# Patient Record
Sex: MALE | Race: WHITE | Employment: FULL TIME | URBAN - METROPOLITAN AREA
[De-identification: names, ages, dates, MRNs, and addresses within clinical notes are randomized per-mention and may not be internally consistent; named-entity substitution may affect disease eponyms.]

---

## 2019-05-19 ENCOUNTER — HOSPITAL ENCOUNTER (EMERGENCY)
Age: 34
Discharge: HOME | End: 2019-05-19
Payer: COMMERCIAL

## 2019-05-19 ENCOUNTER — APPOINTMENT (OUTPATIENT)
Dept: GENERAL RADIOLOGY | Age: 34
End: 2019-05-19
Payer: COMMERCIAL

## 2019-05-19 VITALS — BODY MASS INDEX: 23.73 KG/M2 | HEIGHT: 64 IN | WEIGHT: 139 LBS

## 2019-05-19 DIAGNOSIS — M54.2 CERVICALGIA: Primary | ICD-10-CM

## 2019-05-19 DIAGNOSIS — M43.06 SPONDYLOLYSIS OF LUMBAR REGION: ICD-10-CM

## 2019-05-19 PROCEDURE — 72040 X-RAY EXAM NECK SPINE 2-3 VW: CPT

## 2019-05-19 PROCEDURE — 6370000000 HC RX 637 (ALT 250 FOR IP): Performed by: PHYSICIAN ASSISTANT

## 2019-05-19 PROCEDURE — 99283 EMERGENCY DEPT VISIT LOW MDM: CPT

## 2019-05-19 PROCEDURE — 72100 X-RAY EXAM L-S SPINE 2/3 VWS: CPT

## 2019-05-19 RX ORDER — LIDOCAINE 4 G/G
1 PATCH TOPICAL DAILY
Status: DISCONTINUED | OUTPATIENT
Start: 2019-05-19 | End: 2019-05-19 | Stop reason: HOSPADM

## 2019-05-19 RX ORDER — METHOCARBAMOL 750 MG/1
750 TABLET, FILM COATED ORAL EVERY 8 HOURS PRN
Qty: 30 TABLET | Refills: 0 | Status: SHIPPED | OUTPATIENT
Start: 2019-05-19 | End: 2019-05-29

## 2019-05-19 RX ORDER — METHOCARBAMOL 500 MG/1
750 TABLET, FILM COATED ORAL ONCE
Status: COMPLETED | OUTPATIENT
Start: 2019-05-19 | End: 2019-05-19

## 2019-05-19 RX ORDER — NAPROXEN 250 MG/1
500 TABLET ORAL ONCE
Status: COMPLETED | OUTPATIENT
Start: 2019-05-19 | End: 2019-05-19

## 2019-05-19 RX ORDER — IBUPROFEN 800 MG/1
800 TABLET ORAL EVERY 6 HOURS PRN
COMMUNITY
End: 2019-05-19 | Stop reason: ALTCHOICE

## 2019-05-19 RX ORDER — NAPROXEN 500 MG/1
500 TABLET ORAL 2 TIMES DAILY PRN
Qty: 20 TABLET | Refills: 0 | Status: SHIPPED | OUTPATIENT
Start: 2019-05-19 | End: 2019-06-26 | Stop reason: SDUPTHER

## 2019-05-19 RX ADMIN — NAPROXEN 500 MG: 250 TABLET ORAL at 11:50

## 2019-05-19 RX ADMIN — METHOCARBAMOL TABLETS 750 MG: 500 TABLET, COATED ORAL at 11:50

## 2019-05-19 SDOH — HEALTH STABILITY: MENTAL HEALTH: HOW OFTEN DO YOU HAVE A DRINK CONTAINING ALCOHOL?: NEVER

## 2019-05-19 ASSESSMENT — ENCOUNTER SYMPTOMS
DIARRHEA: 0
COLOR CHANGE: 0
CHEST TIGHTNESS: 0
VOMITING: 0
ABDOMINAL PAIN: 0
BACK PAIN: 1
SHORTNESS OF BREATH: 0
SORE THROAT: 0
NAUSEA: 0

## 2019-05-19 ASSESSMENT — PAIN DESCRIPTION - ORIENTATION: ORIENTATION: LEFT

## 2019-05-19 ASSESSMENT — PAIN SCALES - GENERAL
PAINLEVEL_OUTOF10: 8
PAINLEVEL_OUTOF10: 9

## 2019-05-19 ASSESSMENT — PAIN DESCRIPTION - PAIN TYPE: TYPE: ACUTE PAIN

## 2019-05-19 ASSESSMENT — PAIN DESCRIPTION - LOCATION: LOCATION: NECK

## 2019-05-19 NOTE — ED PROVIDER NOTES
**EVALUATED BY ADVANCED PRACTICE PROVIDER**        [address] ENCOUNTER      Pt Name: Jose Key  EYFLOR:[id number]  [place] 1985  Date of evaluation: 5/19/2019  Provider: Vladimir Perera PA-C      Chief Complaint:    Chief Complaint   Patient presents with    Neck Pain     left sided neck pain x 1 week, and right sided/middle lower back pain x 1 month.  patient's neck getting worse x last 3 days, patient unable to sleep due to pain. Nursing Notes, Past Medical Hx, Past Surgical Hx, Social Hx, Allergies, and Family Hx were all reviewed and agreed with or any disagreements were addressed in the HPI.    HPI:  (Location, Duration, Timing, Severity,Quality, Assoc Sx, Context, Modifying factors)  This is a  37 y.o. male resents to the emergency department with reports of musculoskeletal discomfort. Patient states he has had on going difficulties but twisted his neck as well as his back that it then intermittently present for a number of years. Patient denies that he had any particular injury to bring on the symptoms. He states occasionally he will get pain and discomfort in the midportion of his low back that seems to be worse somewhat on the musculature on the right-hand side but states bending twisting and tension in the area causes increasing symptoms. He states physical activity increase of this as well. He states he often times will take ibuprofen 600 mg for this with only marginal levels of relief. Patient states he is able to put up with that but he has had increasing symptoms with associated cervicalgia. He initially states that he thought that he slept wrong on his neck. He had pain and discomfort primarily left-sided but did seem to radiate into the upper portion of his left arm.   He states he does have a sensation of feeling hot mis-over the forearm down into the thumb but denies these having associated symptoms of pain and denies any he's having true radicular symptoms. He denies bowel or bladder dysfunction. He denies saddle anesthesia. He reports he's had a difficult time getting a restful night's sleep because of the above-mentioned. He states last time he had any medications or done anything for this was yesterday. He states his current level of pain and discomfort rates to be 9 out of 10. He's found nothing to make the symptoms better and are worse and with this he presents for evaluation and treatment. Upon further questioning the patient has no red flags for his spine pain. He denies he is experiencing any additional complaints at present. PastMedical/Surgical History:  History reviewed. No pertinent past medical history. History reviewed. No pertinent surgical history. Medications:  Previous Medications    No medications on file       Review of Systems:  Review of Systems   Constitutional: Negative for activity change, chills, fatigue and fever. HENT: Negative for ear pain and sore throat. Respiratory: Negative for chest tightness and shortness of breath. Cardiovascular: Negative for chest pain. Gastrointestinal: Negative for abdominal pain, diarrhea, nausea and vomiting. Genitourinary: Negative for dysuria and flank pain. Musculoskeletal: Positive for back pain, neck pain and neck stiffness. Skin: Negative for color change, rash and wound. Neurological: Negative for dizziness, tremors, seizures, syncope, facial asymmetry, speech difficulty, weakness, light-headedness, numbness and headaches. Positives and Pertinent negatives as per HPI. Except as noted above in the ROS, problem specific ROS was completed and is negative. Physical Exam:  Physical Exam   Constitutional: He is oriented to person, place, and time. He appears well-developed and well-nourished. No distress. HENT:   Head: Normocephalic and atraumatic.    Right Ear: External ear normal.   Left Ear: External ear normal.   Eyes: Conjunctivae are normal. Right eye exhibits no discharge. Left eye exhibits no discharge. No scleral icterus. Neck: Normal range of motion. No JVD present. Cardiovascular: Normal rate and regular rhythm. Exam reveals no gallop and no friction rub. No murmur heard. Pulmonary/Chest: Effort normal and breath sounds normal. No accessory muscle usage. No respiratory distress. He has no wheezes. He has no rhonchi. He has no rales. Abdominal: Soft. Normal appearance. He exhibits no distension and no mass. There is no tenderness. There is no rigidity, no rebound, no guarding and no CVA tenderness. Musculoskeletal:        Cervical back: He exhibits decreased range of motion, tenderness, bony tenderness and pain. He exhibits no swelling, no edema, no deformity, no laceration, no spasm and normal pulse. Thoracic back: Normal.        Lumbar back: He exhibits decreased range of motion, tenderness, bony tenderness and pain. He exhibits no swelling, no edema, no deformity, no laceration, no spasm and normal pulse. Primary is a tenderness over the left lateral paracervical musculature down into the trapezial musculature in the right lateral lumbar paraspinous musculature. He is a negative Spurling's examination with good range of motion through his neck. He demonstrates 5 out of 5 strength to shoulder abduction, elbow flexion extension, wrist flexion extension,  strength. Neurovascular integrity of his bilateral upper extremities or is intact. He has a normal gait noted as he ambulated to the examination room. Patient has a negative straight leg raise. The patient demonstrates 5/5 muscle strength to hip flexion, knee flexion/extension, plantar/dorsiflexion of the ankle and extensor hallicus longus testing. Neurological: He is alert and oriented to person, place, and time. He has normal strength. No cranial nerve deficit or sensory deficit. Coordination normal. GCS eye subscore is 4.  GCS verbal subscore is 5. GCS motor subscore is 6. Skin: Skin is warm and dry. He is not diaphoretic. Psychiatric: He has a normal mood and affect. His behavior is normal.   Nursing note and vitals reviewed. MEDICAL DECISION MAKING    Vitals:    Vitals:    05/19/19 1103   Weight: 139 lb (63 kg)   Height: 5' 4\" (1.626 m)       LABS:Labs Reviewed - No data to display     Remainder of labs reviewed and werenegative at this time or not returned at the time of this note. RADIOLOGY:   Non-plain film images such as CT, Ultrasound and MRI are read by the radiologist. Randal Fishman PA-C have directly visualized the radiologic plain film image(s) with the below findings:        Interpretation per the Radiologist below, if available at the time of thisnote:    XR CERVICAL SPINE (2-3 VIEWS)   Final Result   1. No acute abnormality. XR LUMBAR SPINE (2-3 VIEWS)   Final Result   1. Minimal multilevel spondylosis. MEDICAL DECISION MAKING / ED COURSE:      PROCEDURES:   Procedures  None    Patient was given:  Medications   lidocaine 4 % external patch 1 patch (1 patch Transdermal Patch Applied 5/19/19 1150)   naproxen (NAPROSYN) tablet 500 mg (500 mg Oral Given 5/19/19 1150)   methocarbamol (ROBAXIN) tablet 750 mg (750 mg Oral Given 5/19/19 1150)       The patient's detailed history of present illness is documented as above. Upon arrival to the emergency department the patient's vital signs are as documented. The patient is noted to be hemodynamically stable and afebrile. Physical examination findings are as above. Patient was medicated with the above-mentioned medications for pain relief in the emergency department setting. Radiographs of the patient's cervical spine and lumbar spine are completed as above. There is no evidence of acute fracture or dislocation. Cervical spine demonstrates loss of normal cervical lordosis bothers multilevel minimal spondylolysis noted in the lumbar spine.   This was

## 2019-06-10 ENCOUNTER — HOSPITAL ENCOUNTER (OUTPATIENT)
Dept: PHYSICAL THERAPY | Age: 34
Setting detail: THERAPIES SERIES
Discharge: HOME | End: 2019-06-10
Payer: COMMERCIAL

## 2019-06-10 PROCEDURE — 97161 PT EVAL LOW COMPLEX 20 MIN: CPT

## 2019-06-10 PROCEDURE — 97140 MANUAL THERAPY 1/> REGIONS: CPT

## 2019-06-10 PROCEDURE — 97110 THERAPEUTIC EXERCISES: CPT

## 2019-06-10 ASSESSMENT — PAIN DESCRIPTION - PROGRESSION: CLINICAL_PROGRESSION: GRADUALLY IMPROVING

## 2019-06-10 ASSESSMENT — PAIN - FUNCTIONAL ASSESSMENT: PAIN_FUNCTIONAL_ASSESSMENT: PREVENTS OR INTERFERES SOME ACTIVE ACTIVITIES AND ADLS

## 2019-06-10 ASSESSMENT — PAIN DESCRIPTION - FREQUENCY: FREQUENCY: CONTINUOUS

## 2019-06-10 ASSESSMENT — PAIN SCALES - GENERAL: PAINLEVEL_OUTOF10: 5

## 2019-06-10 ASSESSMENT — PAIN DESCRIPTION - DESCRIPTORS: DESCRIPTORS: CRAMPING;TIGHTNESS

## 2019-06-10 ASSESSMENT — PAIN DESCRIPTION - LOCATION: LOCATION: NECK;SHOULDER

## 2019-06-10 ASSESSMENT — PAIN DESCRIPTION - ORIENTATION: ORIENTATION: LEFT

## 2019-06-10 ASSESSMENT — PAIN DESCRIPTION - PAIN TYPE: TYPE: ACUTE PAIN

## 2019-06-10 ASSESSMENT — PAIN DESCRIPTION - ONSET: ONSET: AWAKENED FROM SLEEP

## 2019-06-10 NOTE — PROGRESS NOTES
some active activities and ADLs  Vital Signs  Patient Currently in Pain: Yes    Vision/Hearing  Vision  Vision: Within Functional Limits  Hearing  Hearing: Within functional limits    Orientation  Orientation  Overall Orientation Status: Within Normal Limits    Social/Functional History  Social/Functional History  Lives With: Spouse  Type of Home: House  ADL Assistance: Independent  Homemaking Assistance: Independent  Homemaking Responsibilities: Yes  Ambulation Assistance: Independent  Transfer Assistance: Independent  Active : Yes  Mode of Transportation: Car  Occupation: Full time employment  Type of occupation:  - walking, lifting periodically less than 50#     Objective          Spine  Cervical: sitting: flexion 55 deg, ext 55 deg, SBing L 45 deg, SBing R 55 deg, rot R 35 deg, rot L 45 deg    Strength RUE  R Shoulder Flexion: 5/5  R Shoulder ABduction: 5/5  R Shoulder Internal Rotation: 5/5  R Shoulder External Rotation: 5/5  R Elbow Flexion: 5/5  R Elbow Extension: 5/5  Strength LUE  L Shoulder Flexion: 5/5  L Shoulder ABduction: 4+/5  L Shoulder Internal Rotation: 5/5  L Shoulder External Rotation: 4+/5  L Elbow Flexion: 5/5  L Elbow Extension: 5/5  Strength Other  Other: R  strength 65#, 60#, 50#  L  strength: 30#, 30#, 30#       Outpatient fall risk assessment completed asking screening question if patient has fallen in the past 30 days:  [x] Yes  [] No    Based on screen for falls, patient demonstrates fall risk:  [] Yes  [x] No    Interventions based on fall risk status:  Updated Problem List within Medical History  [] Yes   [x] N/A    Asked family to assist with increased observation of the patient  [] Yes   [x] N/A    Patient kept in visible area when not closely supervised by therapist  [] Yes   [x] N/A    Repeatedly reinforce activity limits and safety needs with patient/family  [] Yes   [x] N/A    Increase frequency of rounding/monitoring patient  [] Yes   [x] N/A Assessment   Conditions Requiring Skilled Therapeutic Intervention  Body structures, Functions, Activity limitations: Decreased functional mobility ; Decreased ADL status; Decreased ROM; Decreased strength;Decreased high-level IADLs; Increased Pain  Assessment: Pt is a 36 y/o male who complains of L neck and arm pain. He does report radicular symtpoms into his L shoulder and biceps. Pt currently demos decreased cerv AROM, increased tension in cervicothoracic musculature, malalginment of cerv vertebrae, and increased pain with head movement. Pt would benefit from skilled therapy to address deficits and return to home and work activities without limitations or pain. Treatment Diagnosis: decreased cerv AROM, increased tension in cervicothoracic musculature, malalginment of cerv vertebrae, and increased pain  Prognosis: Good  Decision Making: Low Complexity  History: see above  Exam: see above  Clinical Presentation: pain reducing   Patient Education: PT role and plan  Barriers to Learning: none  REQUIRES PT FOLLOW UP: Yes         Plan   Plan  Times per week: 2x/week for 5 weeks  Current Treatment Recommendations: Strengthening, ROM, Functional Mobility Training, IADL Training, Neuromuscular Re-education, Manual Therapy - Soft Tissue Mobilization, Manual Therapy - Joint Manipulation, Modalities, Home Exercise Program, Positioning      OutComes Score              QuickDASH Total Score: 31 (06/10/19 1653)       QuickDASH Disability/Symptom Score : 45.45 % (06/10/19 1653)      Goals  Long term goals  Time Frame for Long term goals : 5 weeks:  Long term goal 1: Pt will increase L  strength by at least 20# to progress towards return to PLOF. Long term goal 2: Pt will present to one or more visits without cervical vertebral malalignment to ensure proper positioning at home. Long term goal 3: Pt will report pain at 1/10 in neck and shoulder to progress towards to no pain with daily activities or driving.    Long term goal 4: Pt will be I with HEP to avoid future injury and maintain correct alignment.           Alden Gomez, [address] [facility], DPT [id number]

## 2019-06-10 NOTE — FLOWSHEET NOTE
Physical Therapy Daily Treatment Note  Date:  6/10/2019    Patient Name:  Agus Kelsey    :  1985  MRN: [id number]  Restrictions/Precautions:    Medical/Treatment Diagnosis Information:   Diagnosis: cervicalgia  Treatment Diagnosis: decreased cerv AROM, increased tension in cervicothoracic musculature, malalginment of cerv vertebrae, and increased pain    Tracking Information:  Physician Information Referring Practitioner: CARLENE Kelly     Plan of Care Sent Date: 6/10/19 Signed Received:    Visit Count / Total Visits  1/10    Insurance Approved Visits  30 Approved Dates:     Insurance Information PT Insurance Information: [facility] (30 visit limit)     Progress Note/G-codes   [x]  Yes  []  No Next Due: #10     Pain level: 5/10     Subjective:  SEE EVAL    Objective:    SEE EVAL  Observation:   Test measurements:      Exercises:  Exercise/Equipment Resistance/Repetitions Other comments   mat PROM cerv spine x 10 all directions  UT stretch 20 sec x 2 B   levator stretch 20 sec x 2 B                                                                        Other Therapeutic Activities: 6/10/19 Patient was evaluated this date. Patient educated on diagnosis, prognosis, plan of care, benefits, and goals of physical therapy. Also discussed  frequency and duration of scheduling future physical therapy appointments as well answered all patient questions. Educated pt on cervicalgia and nerve involvement.        Home Exercise Program: 6/10/19 The following exercises were performed and added to the pt's home program (educated on appropriate frequency, intensity and duration etc.): PROM cerv spine all directions in pain free range, UT and LS stretches     Manual Treatments:  6/10/19: PROM c spine, side glides grade 2, PA mobs grade 3, rot mobs to correct upper R rotated cerv vert, SOR, manual cerv traction x 10 minutes     Modalities:  6/10/19: none, consider mech cerv traction    Timed Code Treatment Minutes: 33    Total Treatment Minutes:  48    Treatment/Activity Tolerance:  [x] Patient tolerated treatment well [] Patient limited by fatigue  [] Patient limited by pain  [] Patient limited by other medical complications  [] Other:     Prognosis: [x] Good [] Fair  [] Poor    Patient Requires Follow-up: [x] Yes  [] No    Plan:   [] Continue per plan of care [] Alter current plan (see comments)  [x] Plan of care initiated [] Hold pending MD visit [] Discharge    Plan for Next Session:  Mobility of cerv spine, flexibility of B UEs and cervicothoracic musculature, manual PRN, MOC, UE strength and  strength     Electronically signed by:  Louise Dobson, PT, DPT

## 2019-06-18 ENCOUNTER — HOSPITAL ENCOUNTER (OUTPATIENT)
Dept: PHYSICAL THERAPY | Age: 34
Setting detail: THERAPIES SERIES
Discharge: HOME | End: 2019-06-18
Payer: COMMERCIAL

## 2019-06-18 PROCEDURE — 97110 THERAPEUTIC EXERCISES: CPT

## 2019-06-18 PROCEDURE — 97140 MANUAL THERAPY 1/> REGIONS: CPT

## 2019-06-18 NOTE — FLOWSHEET NOTE
Physical Therapy Daily Treatment Note  Date:  2019    Patient Name:  Latoya Gee    :  1985  MRN: [id number]  Restrictions/Precautions:    Medical/Treatment Diagnosis Information:   Diagnosis: cervicalgia  Treatment Diagnosis: decreased cerv AROM, increased tension in cervicothoracic musculature, malalginment of cerv vertebrae, and increased pain    Tracking Information:  Physician Information Referring Practitioner: CARLENE Pickard     Plan of Care Sent Date: 6/10/19 Signed Received:    Visit Count / Total Visits  2/10    Insurance Approved Visits  30 Approved Dates:     Insurance Information PT Insurance Information: [facility] (30 visit limit)     Progress Note/G-codes   []  Yes  [x]  No Next Due: #10     Pain level: 4/10     Subjective:  Pt reports he is having less pain than the last time he was here. Arm pain is no longer constant. Is doing HEP - feel they are getting easier. Objective:      Observation:   Test measurements:      Exercises:  Exercise/Equipment Resistance/Repetitions Other comments   mat     UBE 2 mins forward/2 min retro    wall W slides x 10 with back on wall   Chin tuck with ball behind occiput x 10  cerv rot with ball behind head x 10  cerv flex/ext with ball behind head x 10     cables 30# Mid ROW x 10  30# LPD x 10                                                         Other Therapeutic Activities: 6/10/19 Patient was evaluated this date. Patient educated on diagnosis, prognosis, plan of care, benefits, and goals of physical therapy. Also discussed  frequency and duration of scheduling future physical therapy appointments as well answered all patient questions. Educated pt on cervicalgia and nerve involvement.        Home Exercise Program: 6/10/19 The following exercises were performed and added to the pt's home program (educated on appropriate frequency, intensity and duration etc.): PROM cerv spine all directions in pain free range, UT and LS stretches

## 2019-06-24 ENCOUNTER — HOSPITAL ENCOUNTER (OUTPATIENT)
Dept: PHYSICAL THERAPY | Age: 34
Setting detail: THERAPIES SERIES
Discharge: HOME | End: 2019-06-24
Payer: COMMERCIAL

## 2019-06-24 PROCEDURE — 97110 THERAPEUTIC EXERCISES: CPT

## 2019-06-24 PROCEDURE — 97140 MANUAL THERAPY 1/> REGIONS: CPT

## 2019-06-24 PROCEDURE — 97012 MECHANICAL TRACTION THERAPY: CPT

## 2019-06-24 NOTE — FLOWSHEET NOTE
Physical Therapy Daily Treatment Note  Date:  2019    Patient Name:  Faith Alba    :  1985  MRN: [id number]  Restrictions/Precautions:    Medical/Treatment Diagnosis Information:   Diagnosis: cervicalgia  Treatment Diagnosis: decreased cerv AROM, increased tension in cervicothoracic musculature, malalginment of cerv vertebrae, and increased pain    Tracking Information:  Physician Information Referring Practitioner: CARLENE Hunter     Plan of Care Sent Date: 6/10/19 Signed Received:    Visit Count / Total Visits  3/10    Insurance Approved Visits  30 Approved Dates:     Insurance Information PT Insurance Information: [facility] (30 visit limit)     Progress Note/G-codes   []  Yes  [x]  No Next Due: #10     Pain level: 4/10     Subjective:  Pt reports he is feeling better, no pain into L arm at all. Only has a little pain at UT on L. Objective:      Observation:   Test measurements:      Exercises:  Exercise/Equipment Resistance/Repetitions Other comments   mat     UBE 2 mins forward/2 min retro    wall W slides x 10 with back on wall   Push up PLUS x 10      cables 30# Mid ROW x 10  30# LPD x 10    tbands B ER with green x 15  Hor abd green x 15                                                     Other Therapeutic Activities: 6/10/19 Patient was evaluated this date. Patient educated on diagnosis, prognosis, plan of care, benefits, and goals of physical therapy. Also discussed  frequency and duration of scheduling future physical therapy appointments as well answered all patient questions. Educated pt on cervicalgia and nerve involvement.        Home Exercise Program: 6/10/19 The following exercises were performed and added to the pt's home program (educated on appropriate frequency, intensity and duration etc.): PROM cerv spine all directions in pain free range, UT and LS stretches     Manual Treatments:    : cerv spine PROM, PA mobs through c spine and upper t spine grade 3, STM to cerv paraspinals and B UTs x 8 minutes  6/18: PROM c spine all directions, trigger point release to B UT (L>R), manual cerv traction in neutral , SOR, maximal gapping on L, PA mobs throughout c spine grade 3, side glides B grade 2 x 14 minutes   6/10/19: PROM c spine, side glides grade 2, PA mobs grade 3, rot mobs to correct upper R rotated cerv vert, SOR, manual cerv traction x 10 minutes     Modalities:    6/24: Patient was positioned supine on traction table with bolsters under bilateral knees with head/neck in traction device. Parameters were as follows: 20/10 max/min pounds with on/off ratio of 30/10, for a total of 10 minutes. Patient was given panic button as well as instructed how to use it if experiencing pain. Patient was also given bell to call if anything is needed.    6/10/19: none, consider mech cerv traction    Timed Code Treatment Minutes:  24    Total Treatment Minutes:  34    Treatment/Activity Tolerance:  [x] Patient tolerated treatment well [] Patient limited by fatigue  [] Patient limited by pain  [] Patient limited by other medical complications  [x] Other: pain remaining low, monitor response to mech traction NV    Prognosis: [x] Good [] Fair  [] Poor    Patient Requires Follow-up: [x] Yes  [] No    Plan:   [x] Continue per plan of care [] Alter current plan (see comments)  [] Plan of care initiated [] Hold pending MD visit [] Discharge    Plan for Next Session:  Mobility of cerv spine, flexibility of B UEs and cervicothoracic musculature, manual PRN, MOC, UE strength and  strength     Electronically signed by:  Marianela Graf, PT, DPT

## 2019-06-25 ENCOUNTER — HOSPITAL ENCOUNTER (EMERGENCY)
Age: 34
Discharge: HOME | End: 2019-06-26
Attending: EMERGENCY MEDICINE
Payer: COMMERCIAL

## 2019-06-25 ENCOUNTER — APPOINTMENT (OUTPATIENT)
Dept: GENERAL RADIOLOGY | Age: 34
End: 2019-06-25
Payer: COMMERCIAL

## 2019-06-25 ENCOUNTER — APPOINTMENT (OUTPATIENT)
Dept: CT IMAGING | Age: 34
End: 2019-06-25
Payer: COMMERCIAL

## 2019-06-25 ENCOUNTER — HOSPITAL ENCOUNTER (OUTPATIENT)
Dept: PHYSICAL THERAPY | Age: 34
Setting detail: THERAPIES SERIES
Discharge: HOME | End: 2019-06-25
Payer: COMMERCIAL

## 2019-06-25 DIAGNOSIS — I95.9 HYPOTENSION, UNSPECIFIED HYPOTENSION TYPE: ICD-10-CM

## 2019-06-25 DIAGNOSIS — R50.9 ACUTE FEBRILE ILLNESS: Primary | ICD-10-CM

## 2019-06-25 DIAGNOSIS — R05.9 COUGH: ICD-10-CM

## 2019-06-25 DIAGNOSIS — E87.6 HYPOKALEMIA: ICD-10-CM

## 2019-06-25 LAB
A/G RATIO: 1.3 (ref 1.1–2.2)
ALBUMIN SERPL-MCNC: 3.7 G/DL (ref 3.4–5)
ALP BLD-CCNC: 62 U/L (ref 40–129)
ALT SERPL-CCNC: 21 U/L (ref 10–40)
ANION GAP SERPL CALCULATED.3IONS-SCNC: 9 MMOL/L (ref 3–16)
AST SERPL-CCNC: 18 U/L (ref 15–37)
BASOPHILS ABSOLUTE: 0 K/UL (ref 0–0.2)
BASOPHILS RELATIVE PERCENT: 0.3 %
BILIRUB SERPL-MCNC: 0.7 MG/DL (ref 0–1)
BILIRUBIN URINE: NEGATIVE
BLOOD, URINE: NEGATIVE
BUN BLDV-MCNC: 14 MG/DL (ref 7–20)
CALCIUM SERPL-MCNC: 8.1 MG/DL (ref 8.3–10.6)
CHLORIDE BLD-SCNC: 105 MMOL/L (ref 99–110)
CLARITY: CLEAR
CO2: 23 MMOL/L (ref 21–32)
COLOR: YELLOW
CREAT SERPL-MCNC: 0.6 MG/DL (ref 0.9–1.3)
D DIMER: <200 NG/ML DDU (ref 0–229)
EOSINOPHILS ABSOLUTE: 0 K/UL (ref 0–0.6)
EOSINOPHILS RELATIVE PERCENT: 0.1 %
GFR AFRICAN AMERICAN: >60
GFR NON-AFRICAN AMERICAN: >60
GLOBULIN: 2.9 G/DL
GLUCOSE BLD-MCNC: 144 MG/DL (ref 70–99)
GLUCOSE URINE: NEGATIVE MG/DL
HCT VFR BLD CALC: 41.7 % (ref 40.5–52.5)
HEMOGLOBIN: 14.1 G/DL (ref 13.5–17.5)
KETONES, URINE: NEGATIVE MG/DL
LACTIC ACID, SEPSIS: 2.2 MMOL/L (ref 0.4–1.9)
LEUKOCYTE ESTERASE, URINE: NEGATIVE
LYMPHOCYTES ABSOLUTE: 0.6 K/UL (ref 1–5.1)
LYMPHOCYTES RELATIVE PERCENT: 8.8 %
MAGNESIUM: 1.8 MG/DL (ref 1.8–2.4)
MCH RBC QN AUTO: 32.6 PG (ref 26–34)
MCHC RBC AUTO-ENTMCNC: 33.9 G/DL (ref 31–36)
MCV RBC AUTO: 96.3 FL (ref 80–100)
MICROSCOPIC EXAMINATION: NORMAL
MONOCYTES ABSOLUTE: 0.4 K/UL (ref 0–1.3)
MONOCYTES RELATIVE PERCENT: 6.3 %
NEUTROPHILS ABSOLUTE: 5.8 K/UL (ref 1.7–7.7)
NEUTROPHILS RELATIVE PERCENT: 84.5 %
NITRITE, URINE: NEGATIVE
PDW BLD-RTO: 12.6 % (ref 12.4–15.4)
PH UA: 6.5 (ref 5–8)
PLATELET # BLD: 180 K/UL (ref 135–450)
PMV BLD AUTO: 9.6 FL (ref 5–10.5)
POTASSIUM REFLEX MAGNESIUM: 3.1 MMOL/L (ref 3.5–5.1)
PROTEIN UA: NEGATIVE MG/DL
RAPID INFLUENZA  B AGN: NEGATIVE
RAPID INFLUENZA A AGN: NEGATIVE
RBC # BLD: 4.33 M/UL (ref 4.2–5.9)
REASON FOR REJECTION: NORMAL
REJECTED TEST: NORMAL
S PYO AG THROAT QL: NEGATIVE
SODIUM BLD-SCNC: 137 MMOL/L (ref 136–145)
SPECIFIC GRAVITY UA: 1.02 (ref 1–1.03)
TOTAL PROTEIN: 6.6 G/DL (ref 6.4–8.2)
URINE REFLEX TO CULTURE: NORMAL
URINE TYPE: NORMAL
UROBILINOGEN, URINE: 0.2 E.U./DL
WBC # BLD: 6.9 K/UL (ref 4–11)

## 2019-06-25 PROCEDURE — 96361 HYDRATE IV INFUSION ADD-ON: CPT

## 2019-06-25 PROCEDURE — 70491 CT SOFT TISSUE NECK W/DYE: CPT

## 2019-06-25 PROCEDURE — 94640 AIRWAY INHALATION TREATMENT: CPT

## 2019-06-25 PROCEDURE — 87081 CULTURE SCREEN ONLY: CPT

## 2019-06-25 PROCEDURE — 87880 STREP A ASSAY W/OPTIC: CPT

## 2019-06-25 PROCEDURE — 6360000004 HC RX CONTRAST MEDICATION: Performed by: EMERGENCY MEDICINE

## 2019-06-25 PROCEDURE — 99284 EMERGENCY DEPT VISIT MOD MDM: CPT

## 2019-06-25 PROCEDURE — 87040 BLOOD CULTURE FOR BACTERIA: CPT

## 2019-06-25 PROCEDURE — 2580000003 HC RX 258: Performed by: EMERGENCY MEDICINE

## 2019-06-25 PROCEDURE — 85379 FIBRIN DEGRADATION QUANT: CPT

## 2019-06-25 PROCEDURE — 71046 X-RAY EXAM CHEST 2 VIEWS: CPT

## 2019-06-25 PROCEDURE — 83735 ASSAY OF MAGNESIUM: CPT

## 2019-06-25 PROCEDURE — 6360000002 HC RX W HCPCS: Performed by: PHYSICIAN ASSISTANT

## 2019-06-25 PROCEDURE — 83605 ASSAY OF LACTIC ACID: CPT

## 2019-06-25 PROCEDURE — 80053 COMPREHEN METABOLIC PANEL: CPT

## 2019-06-25 PROCEDURE — 87804 INFLUENZA ASSAY W/OPTIC: CPT

## 2019-06-25 PROCEDURE — 81003 URINALYSIS AUTO W/O SCOPE: CPT

## 2019-06-25 PROCEDURE — 71250 CT THORAX DX C-: CPT

## 2019-06-25 PROCEDURE — 96375 TX/PRO/DX INJ NEW DRUG ADDON: CPT

## 2019-06-25 PROCEDURE — 2580000003 HC RX 258: Performed by: PHYSICIAN ASSISTANT

## 2019-06-25 PROCEDURE — 94760 N-INVAS EAR/PLS OXIMETRY 1: CPT

## 2019-06-25 PROCEDURE — 85025 COMPLETE CBC W/AUTO DIFF WBC: CPT

## 2019-06-25 PROCEDURE — 6370000000 HC RX 637 (ALT 250 FOR IP): Performed by: PHYSICIAN ASSISTANT

## 2019-06-25 RX ORDER — SODIUM CHLORIDE 0.9 % (FLUSH) 0.9 %
10 SYRINGE (ML) INJECTION EVERY 12 HOURS SCHEDULED
Status: DISCONTINUED | OUTPATIENT
Start: 2019-06-25 | End: 2019-06-26 | Stop reason: HOSPADM

## 2019-06-25 RX ORDER — SODIUM CHLORIDE 0.9 % (FLUSH) 0.9 %
10 SYRINGE (ML) INJECTION PRN
Status: DISCONTINUED | OUTPATIENT
Start: 2019-06-25 | End: 2019-06-26 | Stop reason: HOSPADM

## 2019-06-25 RX ORDER — ALBUTEROL SULFATE 2.5 MG/3ML
5 SOLUTION RESPIRATORY (INHALATION) ONCE
Status: COMPLETED | OUTPATIENT
Start: 2019-06-25 | End: 2019-06-25

## 2019-06-25 RX ORDER — 0.9 % SODIUM CHLORIDE 0.9 %
30 INTRAVENOUS SOLUTION INTRAVENOUS ONCE
Status: COMPLETED | OUTPATIENT
Start: 2019-06-25 | End: 2019-06-26

## 2019-06-25 RX ORDER — KETOROLAC TROMETHAMINE 30 MG/ML
15 INJECTION, SOLUTION INTRAMUSCULAR; INTRAVENOUS ONCE
Status: COMPLETED | OUTPATIENT
Start: 2019-06-25 | End: 2019-06-25

## 2019-06-25 RX ORDER — VANCOMYCIN HYDROCHLORIDE 1 G/200ML
15 INJECTION, SOLUTION INTRAVENOUS ONCE
Status: COMPLETED | OUTPATIENT
Start: 2019-06-25 | End: 2019-06-26

## 2019-06-25 RX ORDER — POTASSIUM CHLORIDE 20 MEQ/1
40 TABLET, EXTENDED RELEASE ORAL ONCE
Status: COMPLETED | OUTPATIENT
Start: 2019-06-25 | End: 2019-06-25

## 2019-06-25 RX ORDER — ACETAMINOPHEN 500 MG
1000 TABLET ORAL ONCE
Status: COMPLETED | OUTPATIENT
Start: 2019-06-25 | End: 2019-06-25

## 2019-06-25 RX ORDER — 0.9 % SODIUM CHLORIDE 0.9 %
1000 INTRAVENOUS SOLUTION INTRAVENOUS ONCE
Status: COMPLETED | OUTPATIENT
Start: 2019-06-25 | End: 2019-06-25

## 2019-06-25 RX ADMIN — ALBUTEROL SULFATE 5 MG: 2.5 SOLUTION RESPIRATORY (INHALATION) at 19:36

## 2019-06-25 RX ADMIN — ACETAMINOPHEN 1000 MG: 500 TABLET, FILM COATED ORAL at 20:51

## 2019-06-25 RX ADMIN — KETOROLAC TROMETHAMINE 15 MG: 30 INJECTION, SOLUTION INTRAMUSCULAR at 19:45

## 2019-06-25 RX ADMIN — CEFEPIME HYDROCHLORIDE 2 G: 2 INJECTION, POWDER, FOR SOLUTION INTRAVENOUS at 23:35

## 2019-06-25 RX ADMIN — SODIUM CHLORIDE 1000 ML: 9 INJECTION, SOLUTION INTRAVENOUS at 20:51

## 2019-06-25 RX ADMIN — IOPAMIDOL 75 ML: 755 INJECTION, SOLUTION INTRAVENOUS at 23:15

## 2019-06-25 RX ADMIN — POTASSIUM CHLORIDE 40 MEQ: 1500 TABLET, EXTENDED RELEASE ORAL at 23:04

## 2019-06-25 RX ADMIN — SODIUM CHLORIDE 1000 ML: 9 INJECTION, SOLUTION INTRAVENOUS at 19:45

## 2019-06-25 RX ADMIN — SODIUM CHLORIDE 1782 ML: 9 INJECTION, SOLUTION INTRAVENOUS at 23:04

## 2019-06-25 ASSESSMENT — PAIN SCALES - GENERAL
PAINLEVEL_OUTOF10: 5
PAINLEVEL_OUTOF10: 8
PAINLEVEL_OUTOF10: 9

## 2019-06-25 ASSESSMENT — PAIN DESCRIPTION - LOCATION: LOCATION: HEAD

## 2019-06-25 NOTE — PROGRESS NOTES
Physical Therapy  Cancellation/No-show Note  Patient Name:  Edyta Fournier  :  1985   Date:  2019  Cancelled visits to date: 1  No-shows to date: 0    Patient status for today's appointment patient:  [x]  Cancelled   []  Rescheduled appointment  []  No-show     Reason given by patient:  [x]  Patient ill  []  Conflicting appointment  []  No transportation    []  Conflict with work  []  No reason given  []  Other:     Comments:      Phone call information:   []  Phone call made today to patient at _ time at number provided:      []  Patient answered, conversation as follows:    []  Patient did not answer, message left as follows:  [x]  Phone call not made today    Electronically signed by:  Kristina Dose, PT , DPT

## 2019-06-26 ENCOUNTER — OFFICE VISIT (OUTPATIENT)
Dept: ORTHOPEDIC SURGERY | Age: 34
End: 2019-06-26
Payer: COMMERCIAL

## 2019-06-26 VITALS
HEART RATE: 80 BPM | TEMPERATURE: 99 F | SYSTOLIC BLOOD PRESSURE: 104 MMHG | HEIGHT: 64 IN | DIASTOLIC BLOOD PRESSURE: 67 MMHG | RESPIRATION RATE: 17 BRPM | WEIGHT: 131 LBS | OXYGEN SATURATION: 94 % | BODY MASS INDEX: 22.36 KG/M2

## 2019-06-26 DIAGNOSIS — M53.3 SACROILIAC JOINT DYSFUNCTION: Primary | ICD-10-CM

## 2019-06-26 DIAGNOSIS — M54.59 MECHANICAL LOW BACK PAIN: ICD-10-CM

## 2019-06-26 DIAGNOSIS — M47.817 LUMBOSACRAL SPONDYLOSIS WITHOUT MYELOPATHY: ICD-10-CM

## 2019-06-26 LAB — LACTIC ACID, SEPSIS: 2.1 MMOL/L (ref 0.4–1.9)

## 2019-06-26 PROCEDURE — G8420 CALC BMI NORM PARAMETERS: HCPCS | Performed by: INTERNAL MEDICINE

## 2019-06-26 PROCEDURE — 2580000003 HC RX 258: Performed by: PHYSICIAN ASSISTANT

## 2019-06-26 PROCEDURE — G8427 DOCREV CUR MEDS BY ELIG CLIN: HCPCS | Performed by: INTERNAL MEDICINE

## 2019-06-26 PROCEDURE — 83605 ASSAY OF LACTIC ACID: CPT

## 2019-06-26 PROCEDURE — 99203 OFFICE O/P NEW LOW 30 MIN: CPT | Performed by: INTERNAL MEDICINE

## 2019-06-26 PROCEDURE — 96376 TX/PRO/DX INJ SAME DRUG ADON: CPT

## 2019-06-26 PROCEDURE — 96375 TX/PRO/DX INJ NEW DRUG ADDON: CPT

## 2019-06-26 PROCEDURE — 6360000002 HC RX W HCPCS: Performed by: PHYSICIAN ASSISTANT

## 2019-06-26 PROCEDURE — 96361 HYDRATE IV INFUSION ADD-ON: CPT

## 2019-06-26 PROCEDURE — 96365 THER/PROPH/DIAG IV INF INIT: CPT

## 2019-06-26 PROCEDURE — 1036F TOBACCO NON-USER: CPT | Performed by: INTERNAL MEDICINE

## 2019-06-26 RX ORDER — ONDANSETRON 2 MG/ML
4 INJECTION INTRAMUSCULAR; INTRAVENOUS ONCE
Status: COMPLETED | OUTPATIENT
Start: 2019-06-26 | End: 2019-06-26

## 2019-06-26 RX ORDER — KETOROLAC TROMETHAMINE 30 MG/ML
15 INJECTION, SOLUTION INTRAMUSCULAR; INTRAVENOUS ONCE
Status: COMPLETED | OUTPATIENT
Start: 2019-06-26 | End: 2019-06-26

## 2019-06-26 RX ORDER — 0.9 % SODIUM CHLORIDE 0.9 %
1000 INTRAVENOUS SOLUTION INTRAVENOUS ONCE
Status: COMPLETED | OUTPATIENT
Start: 2019-06-26 | End: 2019-06-26

## 2019-06-26 RX ORDER — KETOROLAC TROMETHAMINE 10 MG/1
10 TABLET, FILM COATED ORAL 3 TIMES DAILY
Qty: 15 TABLET | Refills: 0 | Status: SHIPPED | OUTPATIENT
Start: 2019-06-26 | End: 2019-07-25 | Stop reason: CLARIF

## 2019-06-26 RX ORDER — NAPROXEN 500 MG/1
500 TABLET ORAL 2 TIMES DAILY PRN
Qty: 20 TABLET | Refills: 0 | Status: SHIPPED | OUTPATIENT
Start: 2019-06-26 | End: 2019-07-25 | Stop reason: CLARIF

## 2019-06-26 RX ADMIN — SODIUM CHLORIDE 1000 ML: 9 INJECTION, SOLUTION INTRAVENOUS at 02:36

## 2019-06-26 RX ADMIN — VANCOMYCIN HYDROCHLORIDE 1000 MG: 1 INJECTION, SOLUTION INTRAVENOUS at 00:21

## 2019-06-26 RX ADMIN — ONDANSETRON 4 MG: 2 INJECTION INTRAMUSCULAR; INTRAVENOUS at 01:57

## 2019-06-26 RX ADMIN — KETOROLAC TROMETHAMINE 15 MG: 30 INJECTION, SOLUTION INTRAMUSCULAR at 01:57

## 2019-06-26 ASSESSMENT — ENCOUNTER SYMPTOMS
SORE THROAT: 1
CHEST TIGHTNESS: 0
VOMITING: 0
COLOR CHANGE: 0
DIARRHEA: 0
COUGH: 1
BACK PAIN: 0
EYE PAIN: 0
NAUSEA: 0
SHORTNESS OF BREATH: 1
ABDOMINAL PAIN: 0

## 2019-06-26 ASSESSMENT — PAIN SCALES - GENERAL: PAINLEVEL_OUTOF10: 5

## 2019-06-26 NOTE — ED PROVIDER NOTES
Attending Supervisory Note/Shared Visit   I have personally performed a face to face diagnostic evaluation on this patient. I have reviewed the CATHY's findings. History is remarkable for cough for one month. New fever over last 24 hrs. No GI sx or rash. No urinary sx. No travel. Exam is remarkable for mild tachycardia, SBP that has just came over 100 after 4 L. RADIOLOGY:   Non-plain filmimages such as CT, Ultrasound and MRI are read by the radiologist. Plain radiographic images are visualized and preliminarily interpreted by the emergency physician with the below findings:    Interpretation per the Radiologist below, if available at the time ofthis note:  CT SOFT TISSUE NECK W CONTRAST   Final Result   No acute abnormality of the soft tissue structures of the neck. CT CHEST ABDOMEN PELVIS WO CONTRAST   Final Result   No acute abnormality detected within the chest, abdomen, and pelvis. Limited without intravenous contrast.         XR CHEST STANDARD (2 VW)   Final Result   No acute process.              LABS:  Results for orders placed or performed during the hospital encounter of 06/25/19   Rapid influenza A/B antigens   Result Value Ref Range    Rapid Influenza A Ag Negative Negative    Rapid Influenza B Ag Negative Negative   Strep screen group a throat   Result Value Ref Range    Rapid Strep A Screen Negative Negative   CBC Auto Differential   Result Value Ref Range    WBC 6.9 4.0 - 11.0 K/uL    RBC 4.33 4.20 - 5.90 M/uL    Hemoglobin 14.1 13.5 - 17.5 g/dL    Hematocrit 41.7 40.5 - 52.5 %    MCV 96.3 80.0 - 100.0 fL    MCH 32.6 26.0 - 34.0 pg    MCHC 33.9 31.0 - 36.0 g/dL    RDW 12.6 12.4 - 15.4 %    Platelets 440 844 - 496 K/uL    MPV 9.6 5.0 - 10.5 fL    Neutrophils % 84.5 %    Lymphocytes % 8.8 %    Monocytes % 6.3 %    Eosinophils % 0.1 %    Basophils % 0.3 %    Neutrophils # 5.8 1.7 - 7.7 K/uL    Lymphocytes # 0.6 (L) 1.0 - 5.1 K/uL    Monocytes # 0.4 0.0 - 1.3 K/uL HISTORY: ORDERING SYSTEM PROVIDED HISTORY: r/o pna TECHNOLOGIST PROVIDED HISTORY: Reason for exam:->r/o pna Ordering Physician Provided Reason for Exam: Cough (cough for a month. Now with fever and headache. ) Acuity: Unknown Type of Exam: Unknown FINDINGS: There is mild elevation of the left hemidiaphragm. The lungs are without acute focal process. There is no effusion or pneumothorax. The cardiomediastinal silhouette is without acute process. The osseous structures are without acute process. No acute process. Ct Soft Tissue Neck W Contrast    Result Date: 6/26/2019  EXAMINATION: CT OF THE NECK SOFT TISSUE WITH CONTRAST  6/25/2019 TECHNIQUE: CT of the neck was performed with the administration of intravenous contrast. Multiplanar reformatted images are provided for review. Dose modulation, iterative reconstruction, and/or weight based adjustment of the mA/kV was utilized to reduce the radiation dose to as low as reasonably achievable. COMPARISON: None. HISTORY: ORDERING SYSTEM PROVIDED HISTORY: pain, fever TECHNOLOGIST PROVIDED HISTORY: Acuity: Unknown Type of Exam: Unknown FINDINGS: PHARYNX/LARYNX:  The palatine tonsils are normal in appearance. The tongue is normal in appearance. The valleculae, epiglottis, aryepiglottic folds and pyriform sinuses appear unremarkable. The true and false vocal cords are normal in appearance. No mass or abscess is seen. SALIVARY GLANDS/THYROID:  The parotid and submandibular glands appear unremarkable. The thyroid gland appears unremarkable. LYMPH NODES:  No cervical or supraclavicular lymphadenopathy is seen. SOFT TISSUES:  No appreciable soft tissue swelling or mass is seen. BRAIN/ORBITS/SINUSES:  The visualized portion of the intracranial contents appear unremarkable. The visualized portion of the orbits, paranasal sinuses and mastoid air cells demonstrate no acute abnormality.  LUNG APICES/SUPERIOR MEDIASTINUM:  No focal consolidation is seen within the visualized lung apices. No superior mediastinal lymphadenopathy or mass. The visualized portion of the trachea appears unremarkable. BONES:  No aggressive appearing lytic or blastic bony lesion. No acute abnormality of the soft tissue structures of the neck. Ct Chest Abdomen Pelvis Wo Contrast    Result Date: 6/26/2019  EXAMINATION: CT OF THE CHEST, ABDOMEN, AND PELVIS WITHOUT CONTRAST 6/25/2019 7:58 pm TECHNIQUE: CT of the chest, abdomen and pelvis was performed without the administration of intravenous contrast. Multiplanar reformatted images are provided for review. Dose modulation, iterative reconstruction, and/or weight based adjustment of the mA/kV was utilized to reduce the radiation dose to as low as reasonably achievable. COMPARISON: None HISTORY: ORDERING SYSTEM PROVIDED HISTORY: fever, HoTN - OK to use contrast from CT Neck TECHNOLOGIST PROVIDED HISTORY: Reason for exam:->fever, HoTN - OK to use contrast from CT Neck Additional Contrast?->None Ordering Physician Provided Reason for Exam: fever Acuity: Unknown Type of Exam: Unknown Relevant Medical/Surgical History: (cough for a month. Now with fever and headache. ) FINDINGS: Chest: Mediastinum: Evaluation is limited without intravenous contrast.  Visualized thyroid unremarkable. No mediastinal lymphadenopathy identified. Noncontrast imaging of the cardiac chambers unremarkable. Noncontrast imaging of the thoracic aorta and pulmonary arteries is unremarkable. Lungs/pleura: Mild dependent atelectasis. Lungs are otherwise clear. Soft Tissues/Bones: No suspicious osteolytic or osteoblastic bone lesions. Bone island is noted in the right glenoid. No acute bony abnormalities are detected. Abdomen/Pelvis: Organs: Again, evaluation is limited without intravenous contrast.  Having said that, no acute or suspicious hepatic or splenic abnormality is identified. Normal adrenals. Normal noncontrast imaging of the pancreas.  No acute renal abnormalities are identified. No renal or ureteral calculi are found. GI/Bowel: No large bowel abnormalities are detected. Appendix is unremarkable in appearance. Hyperdense material within the stomach is noted layering dependently, presumably something the patient has ingested (such as milk of magnesia). Otherwise, the stomach and duodenal sweep are unremarkable in appearance. No small bowel abnormalities are identified. Pelvis: Urinary bladder and prostate are unremarkable. Peritoneum/Retroperitoneum: Abdominal aorta is normal in caliber. No retroperitoneal lymphadenopathy is identified. No iliac chain or inguinal lymphadenopathy. Bones/Soft Tissues: No suspicious osteolytic or osteoblastic bone lesions are identified. No acute bony abnormality detected. No acute abnormality detected within the chest, abdomen, and pelvis. Limited without intravenous contrast.       Plan: S/p vanc and cefipime for sepsis criteria concerns. No concerning findings on CT abd, C-spine and CT soft tissue neck. K has been replaced. Neg flu and strep. Concern for sepsis due to unknown, likely resp, source. Toradol has just been repeated. Admission requested. Hospitalist speaks patient's native language. Appreciate hospitalists consult in the room. No concerning findings for malaria or TB or other diseases from developing world. Given resolution of fever and no acute findings on lab work, most likely viral syndrome. As fever has resolved, blood pressure is stable, and patient has no tachycardia, we discussed disposition plans and that patient is stable for discharge. Patient requesting discharge and is happy with plan. Patient will follow closely with his doctor. Patient to use Tylenol for fever and will also use Naprosyn to control fever and body aches, as needed. He will return to the ER with any concern. Patient is happy with this plan.     Mary Reyes MD  Attending Emergency Physician          Shilpi Doyle, MD  06/26/19 0691

## 2019-06-26 NOTE — ED NOTES
Discharge instructions reviewed with pt. Pt verbalized understanding. Peripheral IV removed without complications. Pt given copy of discharge instructions.       Jarred Rothman RN  06/26/19 [id number]

## 2019-06-26 NOTE — ED NOTES
Pt states he is feeling better, denies needs at this time. Call light within reach.       Niels Garcia RN  06/25/19 [id number]

## 2019-06-26 NOTE — ED NOTES
Pt states he is ready to go home, feeling better. Tomasa Hartley, [address] aware.       Julia Farley RN  06/26/19 [id number]

## 2019-06-26 NOTE — ED PROVIDER NOTES
I independently performed a history and physical on Salina Kirkland. All diagnostic, treatment, and disposition decisions were made by myself in conjunction with the advanced practice provider. Briefly, this is a 37 y.o. male here for fevers, chills, coughing. Patient denies headache, acute focal neurological deficits, visual changes or seizures. Also denies any abdominal complaints. Denies chest pain or pressure  See CATHY note      On exam:  Constitutional:  Well developed, well nourished, non-toxic appearance   Eyes:  PERRL, conjunctiva normal   HENT:  Atraumatic, external ears normal, nosenormal, oropharynx moist, no pharyngeal exudates. Neck- normal range of motion, supple   Respiratory:  No respiratory distress, normal breath sounds, no rales, no wheezing   Cardiovascular:  Elevated HR at times , normal rhythm, no murmurs,   GI:  Soft, nondistended, nontender, no obvious organomegaly, no mass, no rebound, no guarding   Musculoskeletal:  No tenderness, no deformities. Integument:  no rashes on exposed surfaces  Neurologic:  Alert & oriented x 3,  normalmotor function, normal sensory function, no focal deficits noted   Psychiatric:  Speech and behavior appropriate. No agitation. MDM    Patient did have episodes of hypotension in the ER today. We did give him some IV fluids. He the only felt slightly improved and we did recommend and suggest admission. Pt OK with admit. Hospitalist came down to evaluate the patient, recommended more fluids and then discharged him and did generate a note. Hosp team d/c'ed pt. SEE CATHY NOTE        Critical Care  There was a high probability of life-threatening clinical deterioration in the patient's condition requiring my urgent intervention. Total critical care time with the patient was 31 minutes excluding separately reportable procedures. Critical care required due to patients hypotension. Patient Referrals:   Your Doctor    Schedule an appointment as soon as possible for a visit   URI, low potassium, fevers    [facility]  [address] [facility]  [phone]    If symptoms worsen      Discharge Medications:  Discharge Medication List as of 6/26/2019  5:19 AM          FINAL IMPRESSION  1. Acute febrile illness    2. Cough    3. Hypotension, unspecified hypotension type    4. Hypokalemia        Blood pressure 104/67, pulse 80, temperature 99 °F (37.2 °C), temperature source Oral, resp. rate 17, height 5' 4\" (1.626 m), weight 131 lb (59.4 kg), SpO2 94 %. For further details of   [address], [facility]'s emergency department encounter, please see documentation by advanced practice provider.        Oma Pelayo III,   06/27/19 [id number]

## 2019-06-26 NOTE — ED NOTES
Pt states headache is getting better. Denies needs at this time. Call light within reach.       Zbigniew Deutsch RN  06/25/19 2048

## 2019-06-26 NOTE — CONSULTS
[facility] [address]                     [address] [facility], [address]                                  CONSULTATION    PATIENT NAME: Tae FIGUEROA                          :        1985  MED REC NO:   [id number]                          ROOM:       [id number]  ACCOUNT NO:   [de-identified]                           ADMIT DATE: 2019  PROVIDER:     Aleksandra Hernandez MD    CONSULT DATE:  2019    I obtained the history and performed the physical exam on the patient in  the emergency room on 2019. This is a consultation in the  emergency room at the request of the emergency room physician and the  PA.    CHIEF COMPLAINT:  Sore throat and fevers. HISTORY OF PRESENT ILLNESS:  This 78-year-old male of [place] descent  presents to the hospital with what he describes as a one-month history  of vague cough that happens during the day and night, not getting worse  until the last two-three days when it got worse according to him. No  associated with any nausea or vomiting. In the last day or two, the  patient states he has some fevers and chills. No other constitutional  symptoms. The patient did state that he was feeling a little fatigued,  but after he was given IV fluids, he is feeling significantly better,  and in fact, at the time of my exam, the patient is looking visibly  better and is sitting comfortably in bed, in no acute distress. The  patient denies any hemoptysis or hematemesis. No melena or  hematochezia. The patient denies any recent travel history. Denies any recent  exposure to tuberculosis. He states that the last time he went to [facility]  was over a year ago. He has not traveled outside the [facility] in  over a years. PAST MEDICAL/PAST SURGICAL HISTORY:  None. ALLERGIC HISTORY:  No known allergies. FAMILY HISTORY:  Reviewed by me and is currently noncontributory. SOCIAL HISTORY:  Nonsmoker. Nonalcoholic. No illicit substance use. no acute  anomaly. CT chest, abdomen, and pelvis without contrast shows no acute  anomaly in the chest, abdomen, or pelvis. Lactate was 2.1. ASSESSMENT:  Viral upper respiratory infection with systemic  inflammatory response syndrome without evidence of sepsis. The  patient's lactic acid elevation is at this point in time inconsequential  in my opinion since there is more than one cause is for lactic acid  elevation and sepsis is not always the cause of lactic acid elevation. The patient was treated with significant amount of intravenous hydration  in the ER and has responded adequately. The patient was also given vancomycin and cefepime, however I do not  have a good valid source of infection detectable. The recommendation currently, based on my communication with the patient  who feels much better than before and does in fact appear interested in  going home is discharged home with instructions to return to the  emergency room if his symptoms worsen or if he has any persistence of  these symptoms after he goes home. At this point in time, the patient's  blood cultures have been obtained in the emergency room and if the  patient's blood cultures do come back positive for any organism as per  standard practice, the patient will need to be contacted with  instructions to return back to the emergency room at that point in time. Currently, I do not have a good indication to continue the patient on  any broad spectrum antibiotics since the risk of continuing antibiotics  currently outweigh the benefits. We will be more than happy to assist if the patient has to come back to  the emergency room and does in fact need admission at that point in time  for further evaluation.         Neetu Alvarez MD    D: 06/26/2019 4:31:31       T: 06/26/2019 5:40:38     [email or web address]  Job#: [id number]     Doc#: [id number]    CC:  <>

## 2019-06-26 NOTE — PROGRESS NOTES
spondylosis without myelopathy     Mechanical low back pain               Plan: Activity modification-caution against overuse active rest avoidance of impact activities until sacroiliac joint stabilized  Resume physical therapy pelvic stabilization lumbar stabilization program modalities of choice dry needling treatments manual techniques as needed  MRI evaluation lumbar spine if symptoms persist show no appreciable change or improvement despite stabilization of the sacroiliac joint  High-dose NSAIDs-Toradol GI precaution PRN use of muscle relaxants previously prescribed      The nature of the finding, probable diagnosis and likely treatment was thoroughly discussed with the patient and spouse. The options, risks, complications, alternative treatment as well as some of the differential diagnosis was discussed. The patient was thoroughly informed and all questions were answered. the patient indicated understanding and satisfaction with the discussion. Orders:      No orders of the defined types were placed in this encounter. Disclaimer: \"This note was dictated with voice recognition software. Though review and correction are routine, we apologize for any errors. \"

## 2019-06-26 NOTE — PROGRESS NOTES
Called by ED to evaluate for admission. Entire chart reviewed. Vitals examined. Discussed case with ED PA. Recommendation- ed observation with continued reasonable hydration and symptomatic treatment. Lactic acid of 2.1 in this case most likely does not represent sepsis. Currently no criteria for admission. Will reevaluate after ED observation for admission, if necessary. ED PA in agreement with plan of care. Amount of fluid resuscitation to be decided by ED PA/ED physician. Will assist if/as necessary.

## 2019-06-26 NOTE — ED PROVIDER NOTES
[address] ENCOUNTER        Pt Name: Reymundo Last  MRN: [id number]  [place] 1985  Date of evaluation: 6/25/2019  Provider: Blossom Dunbar PA-C  PCP: No primary care provider on file. This patient was seen and evaluated by the attending physician Claudia Wagner III, Dr 15       Chief Complaint   Patient presents with    Cough     cough for a month. Now with fever and headache. HISTORY OF PRESENT ILLNESS   (Location/Symptom, Timing/Onset, Context/Setting, Quality, Duration, Modifying Factors, Severity)  Note limiting factors. Reymundo Last is a 37 y.o. male resents the emergency department with difficulties as a pertains to a cough. Patient states he has had a relatively dry nonproductive cough for more than a month. He goes on to report he also has associated symptoms where it feels like something is sometimes stuck on the left side of his neck. He states it feels somewhat as a tickle. He goes on to report he has had associated fatigue as well as malaise. He states he has felt chilled and believes that he has a fever but also does not report he has not taken in the home environment. He does not report that today while at work he was experiencing some diffuse generalized headache pain. He denies that this is not the worst headache of his life. He states he has not had associated symptoms of meningismus with this. He is on to report that he took Tylenol in the home environment and thinks that this was beneficial.  He is currently reporting his pain and discomfort to be 5 out of 10. He is found nothing to make the symptoms better and or worsen because of the progressive symptomatology who presents to the emergency department for evaluation and treatment. He has not had any recent travel. He has not had any sick contacts that he is aware of.   He denies that he is currently experiencing substernal chest pain, palpitations or nikko shortness of breath. He denies abdominal and or flank pain. He denies dysuria, urgency, frequency. He does have reports of mild sore throat pain. He reports no additional complaints voiced at the present time. Upon further questioning he denies a history of unilateral leg pain or swelling. He denies a history of travel. He denies any history of DVT and or PE. Has no additional risk factors for thromboembolic events. Nursing Notes were all reviewed and agreed with or any disagreements were addressed  in the HPI. REVIEW OF SYSTEMS    (2-9 systems for level 4, 10 or more for level 5)     Review of Systems   Constitutional: Positive for chills, fatigue and fever. Negative for activity change. HENT: Positive for sore throat. Negative for ear pain. Eyes: Negative for pain and visual disturbance. Respiratory: Positive for cough and shortness of breath. Negative for chest tightness. Cardiovascular: Negative for chest pain and leg swelling. Gastrointestinal: Negative for abdominal pain, diarrhea, nausea and vomiting. Genitourinary: Negative for dysuria, flank pain and hematuria. Musculoskeletal: Negative for back pain and myalgias. Skin: Negative for color change and wound. Allergic/Immunologic: Negative for immunocompromised state. Neurological: Positive for headaches. Negative for seizures, syncope and weakness. Positives and Pertinent negatives as per HPI. Except as noted abovein the ROS, all other systems were reviewed and negative. PAST MEDICAL HISTORY   History reviewed. No pertinent past medical history. SURGICAL HISTORY   History reviewed. No pertinent surgical history. CURRENTMEDICATIONS       Previous Medications    NAPROXEN (NAPROSYN) 500 MG TABLET    Take 1 tablet by mouth 2 times daily as needed for Pain         ALLERGIES     Patient has no known allergies. FAMILYHISTORY     History reviewed. No pertinent family history. SOCIAL HISTORY       Social History     Socioeconomic History    Marital status:      Spouse name: None    Number of children: None    Years of education: None    Highest education level: None   Occupational History    None   Social Needs    Financial resource strain: None    Food insecurity:     Worry: None     Inability: None    Transportation needs:     Medical: None     Non-medical: None   Tobacco Use    Smoking status: Never Smoker    Smokeless tobacco: Never Used   Substance and Sexual Activity    Alcohol use: Never     Frequency: Never    Drug use: Never    Sexual activity: None   Lifestyle    Physical activity:     Days per week: None     Minutes per session: None    Stress: None   Relationships    Social connections:     Talks on phone: None     Gets together: None     Attends [employer] service: None     Active member of club or organization: None     Attends meetings of clubs or organizations: None     Relationship status: None    Intimate partner violence:     Fear of current or ex partner: None     Emotionally abused: None     Physically abused: None     Forced sexual activity: None   Other Topics Concern    None   Social History Narrative    None       SCREENINGS             PHYSICAL EXAM    (up to 7 for level 4, 8 or more for level 5)     ED Triage Vitals   BP Temp Temp Source Pulse Resp SpO2 Height Weight   06/25/19 1638 06/25/19 1638 06/25/19 1638 06/25/19 1638 06/25/19 1638 06/25/19 1936 06/25/19 1638 06/25/19 1638   (!) 110/59 99.9 °F (37.7 °C) Infrared 113 20 98 % 5' 4\" (1.626 m) 131 lb (59.4 kg)       Physical Exam   Constitutional: He is oriented to person, place, and time. He appears well-developed and well-nourished. He is active and cooperative. He appears ill. No distress. HENT:   Head: Normocephalic and atraumatic. Right Ear: Hearing, tympanic membrane, external ear and ear canal normal. No mastoid tenderness. No hemotympanum.    Left Ear: Hearing, tympanic membrane, external ear and ear canal normal. No mastoid tenderness. No hemotympanum. Nose: Nose normal.   Mouth/Throat: Uvula is midline. No trismus in the jaw. Posterior oropharyngeal erythema present. No oropharyngeal exudate, posterior oropharyngeal edema or tonsillar abscesses. No tonsillar exudate. Eyes: Pupils are equal, round, and reactive to light. Conjunctivae and EOM are normal. Right eye exhibits no discharge. Left eye exhibits no discharge. No scleral icterus. Neck: Full passive range of motion without pain. No JVD present. No Brudzinski's sign and no Kernig's sign noted. Cardiovascular: Regular rhythm. Tachycardia present. Exam reveals no gallop and no friction rub. No murmur heard. Pulmonary/Chest: Effort normal. No accessory muscle usage. No respiratory distress. He has wheezes. He has no rhonchi. He has no rales. Faint scattered end expiratory wheeze noted. Abdominal: Soft. Normal appearance and bowel sounds are normal. He exhibits no distension and no mass. There is no tenderness. There is no rigidity, no rebound, no guarding and no CVA tenderness. Neurological: He is alert and oriented to person, place, and time. He has normal strength. He displays no tremor. No cranial nerve deficit or sensory deficit. He exhibits normal muscle tone. He displays no seizure activity. Coordination and gait normal. GCS eye subscore is 4. GCS verbal subscore is 5. GCS motor subscore is 6. Skin: Skin is warm, dry and intact. No rash noted. He is not diaphoretic. Psychiatric: He has a normal mood and affect. His behavior is normal.   Nursing note and vitals reviewed.       DIAGNOSTIC RESULTS   LABS:    Labs Reviewed   CBC WITH AUTO DIFFERENTIAL - Abnormal; Notable for the following components:       Result Value    Lymphocytes # 0.6 (*)     All other components within normal limits    Narrative:     Performed at:  [facility] [address], [facility], [address] Performed at:  [facility]  [address] [place], [address]   Phone [phone]   URINE RT REFLEX TO CULTURE    Narrative:     Performed at:  [facility]  [address], [address]   Phone [phone]   CULTURE BETA STREP CONFIRM PLATE       All other labs were within normal range or not returned as of this dictation. EKG: All EKG's are interpreted by the Emergency Department Physician in the absence of a cardiologist.  Please see their note for interpretation of EKG. RADIOLOGY:   Non-plain film images such as CT, Ultrasound and MRI are read by the radiologist. Plain radiographic images are visualized andpreliminarily interpreted by the  ED Provider with the below findings:        Interpretation perthe Radiologist below, if available at the time of this note:    CT SOFT TISSUE NECK W CONTRAST   Final Result   No acute abnormality of the soft tissue structures of the neck. CT CHEST ABDOMEN PELVIS WO CONTRAST   Final Result   No acute abnormality detected within the chest, abdomen, and pelvis. Limited without intravenous contrast.         XR CHEST STANDARD (2 VW)   Final Result   No acute process. Xr Chest Standard (2 Vw)    Result Date: 6/25/2019  EXAMINATION: TWO XRAY VIEWS OF THE CHEST 6/25/2019 4:34 pm COMPARISON: None. HISTORY: ORDERING SYSTEM PROVIDED HISTORY: r/o pna TECHNOLOGIST PROVIDED HISTORY: Reason for exam:->r/o pna Ordering Physician Provided Reason for Exam: Cough (cough for a month. Now with fever and headache. ) Acuity: Unknown Type of Exam: Unknown FINDINGS: There is mild elevation of the left hemidiaphragm. The lungs are without acute focal process. There is no effusion or pneumothorax. The cardiomediastinal silhouette is without acute process. The osseous structures are without acute process. No acute process.      Ct Soft Tissue Neck W Contrast    Result Date: 6/26/2019  EXAMINATION: CT OF THE NECK SOFT TISSUE WITH CONTRAST  6/25/2019 TECHNIQUE: CT of the neck was performed with the administration of intravenous contrast. Multiplanar reformatted images are provided for review. Dose modulation, iterative reconstruction, and/or weight based adjustment of the mA/kV was utilized to reduce the radiation dose to as low as reasonably achievable. COMPARISON: None. HISTORY: ORDERING SYSTEM PROVIDED HISTORY: pain, fever TECHNOLOGIST PROVIDED HISTORY: Acuity: Unknown Type of Exam: Unknown FINDINGS: PHARYNX/LARYNX:  The palatine tonsils are normal in appearance. The tongue is normal in appearance. The valleculae, epiglottis, aryepiglottic folds and pyriform sinuses appear unremarkable. The true and false vocal cords are normal in appearance. No mass or abscess is seen. SALIVARY GLANDS/THYROID:  The parotid and submandibular glands appear unremarkable. The thyroid gland appears unremarkable. LYMPH NODES:  No cervical or supraclavicular lymphadenopathy is seen. SOFT TISSUES:  No appreciable soft tissue swelling or mass is seen. BRAIN/ORBITS/SINUSES:  The visualized portion of the intracranial contents appear unremarkable. The visualized portion of the orbits, paranasal sinuses and mastoid air cells demonstrate no acute abnormality. LUNG APICES/SUPERIOR MEDIASTINUM:  No focal consolidation is seen within the visualized lung apices. No superior mediastinal lymphadenopathy or mass. The visualized portion of the trachea appears unremarkable. BONES:  No aggressive appearing lytic or blastic bony lesion. No acute abnormality of the soft tissue structures of the neck.      Ct Chest Abdomen Pelvis Wo Contrast    Result Date: 6/26/2019  EXAMINATION: CT OF THE CHEST, ABDOMEN, AND PELVIS WITHOUT CONTRAST 6/25/2019 7:58 pm TECHNIQUE: CT of the chest, abdomen and pelvis was performed without the administration of intravenous contrast. Multiplanar reformatted images are provided for review. Dose modulation, iterative reconstruction, and/or weight based adjustment of the mA/kV was utilized to reduce the radiation dose to as low as reasonably achievable. COMPARISON: None HISTORY: ORDERING SYSTEM PROVIDED HISTORY: fever, HoTN - OK to use contrast from CT Neck TECHNOLOGIST PROVIDED HISTORY: Reason for exam:->fever, HoTN - OK to use contrast from CT Neck Additional Contrast?->None Ordering Physician Provided Reason for Exam: fever Acuity: Unknown Type of Exam: Unknown Relevant Medical/Surgical History: (cough for a month. Now with fever and headache. ) FINDINGS: Chest: Mediastinum: Evaluation is limited without intravenous contrast.  Visualized thyroid unremarkable. No mediastinal lymphadenopathy identified. Noncontrast imaging of the cardiac chambers unremarkable. Noncontrast imaging of the thoracic aorta and pulmonary arteries is unremarkable. Lungs/pleura: Mild dependent atelectasis. Lungs are otherwise clear. Soft Tissues/Bones: No suspicious osteolytic or osteoblastic bone lesions. Bone island is noted in the right glenoid. No acute bony abnormalities are detected. Abdomen/Pelvis: Organs: Again, evaluation is limited without intravenous contrast.  Having said that, no acute or suspicious hepatic or splenic abnormality is identified. Normal adrenals. Normal noncontrast imaging of the pancreas. No acute renal abnormalities are identified. No renal or ureteral calculi are found. GI/Bowel: No large bowel abnormalities are detected. Appendix is unremarkable in appearance. Hyperdense material within the stomach is noted layering dependently, presumably something the patient has ingested (such as milk of magnesia). Otherwise, the stomach and duodenal sweep are unremarkable in appearance. No small bowel abnormalities are identified. Pelvis: Urinary bladder and prostate are unremarkable. Peritoneum/Retroperitoneum: Abdominal aorta is normal in caliber.   No retroperitoneal lymphadenopathy is identified. No iliac chain or inguinal lymphadenopathy. Bones/Soft Tissues: No suspicious osteolytic or osteoblastic bone lesions are identified. No acute bony abnormality detected. No acute abnormality detected within the chest, abdomen, and pelvis. Limited without intravenous contrast.           PROCEDURES   Unless otherwise noted below, none     Procedures    CRITICAL CARE TIME   Because of the high probability of sudden clinical deterioration of the patients condition and to prevent further deterioration, my critical care time involved my full attention to the patients condition, and included chart data review, documentation, medication ordering, viewing the patients old records, reevaluation of the patient's cardiac, pulmonary, and neurological status. Reassessing vital signs. Consutlations with off service physician. Ordering, interpreting reviewing diagnostic testing. Therefore my critical care time was 35 minutes of direct attention to the patients condition and did not include time spent on procedures.       CONSULTS:  PHARMACY TO DOSE VANCOMYCIN  IP CONSULT TO HOSPITALIST      EMERGENCY DEPARTMENT COURSE and DIFFERENTIAL DIAGNOSIS/MDM:   Vitals:    Vitals:    06/26/19 0330 06/26/19 0338 06/26/19 0400 06/26/19 0430   BP: 107/63  109/62 104/67   Pulse:   81 80   Resp:    17   Temp:  100.7 °F (38.2 °C)     TempSrc:  Oral     SpO2: 92%  95% 94%   Weight:       Height:           Patient was given thefollowing medications:  Medications   sodium chloride flush 0.9 % injection 10 mL (10 mLs Intravenous Not Given 6/26/19 0021)   sodium chloride flush 0.9 % injection 10 mL (has no administration in time range)   0.9 % sodium chloride bolus (0 mLs Intravenous Stopped 6/25/19 2048)   ketorolac (TORADOL) injection 15 mg (15 mg Intravenous Given 6/25/19 1945)   albuterol (PROVENTIL) nebulizer solution 5 mg (5 mg Nebulization Given 6/25/19 1936)   acetaminophen (TYLENOL) tablet 1,000 mg (1,000 mg Oral Given 6/25/19 2051)   0.9 % sodium chloride bolus (0 mLs Intravenous Stopped 6/25/19 2304)   0.9 % sodium chloride IV bolus 1,782 mL (0 mL/kg × 59.4 kg Intravenous Stopped 6/26/19 0134)   cefepime (MAXIPIME) 2 g IVPB minibag (0 g Intravenous Stopped 6/26/19 0011)   vancomycin (VANCOCIN) 1000 mg in dextrose 5% 200 mL IVPB (0 mg/kg × 59.4 kg Intravenous Stopped 6/26/19 0135)   potassium chloride (KLOR-CON M) extended release tablet 40 mEq (40 mEq Oral Given 6/25/19 2304)   iopamidol (ISOVUE-370) 76 % injection 75 mL (75 mLs Intravenous Given 6/25/19 2315)   ondansetron (ZOFRAN) injection 4 mg (4 mg Intravenous Given 6/26/19 0157)   ketorolac (TORADOL) injection 15 mg (15 mg Intravenous Given 6/26/19 0157)   0.9 % sodium chloride bolus (0 mLs Intravenous Stopped 6/26/19 0338)       The patient's detailed history of present illness is documented as above. Upon arrival to the emergency department the patient's vital signs are as documented. The patient is noted to be hemodynamically stable and afebrile at 99.9 upon arrival.  He was noted to be tachycardic at 113 and tachypneic at 24 and it was normotensive. Shortly after the patient did arrive to the emergency department he did have a documented temperature of 101.2. He had already received Toradol but was also given supplemental Tylenol for the above-mentioned. Physical examination findings are as above. IV fluids at [place] been initiated. Despite this the patient did have a few bouts of hypotension with systolic readings in the 38X. The exact etiology of this is not currently noted but of concern because the patient does have SIRS criteria. The patient's CBC is as documented above demonstrating no evidence of significant leukocytosis, anemia, thrombocytopenia and/or thrombocytosis. Presence of metabolic panel demonstrates a BUN of 14 and creatinine 0.6 point nonfasting glucose 144. Potassium is 3.1 will be replaced as above.   The remainder of his electrolytes and LFTs are unremarkable. Magnesium is 1.8. Initial lactic acid returned elevated at 2.2. D-dimer is negative predictive value. Urine demonstrates no evidence of infection. Rapid strep screening is negative influenza screening is negative. CT soft tissue neck with contrast was completed because of concerns for possible underlying infection as a potential cause of the patient's symptoms and to rule out an infectious source for sepsis. This demonstrates no evidence of acute abnormality and soft tissue structures in the neck. The attending physician suggested CT abdomen and pelvis without contrast at the same time to evaluate for the potential for source. In the interim recommendation for antibiotic coverage cefepime and vancomycin was initiated. Aggressive fluid hydration as documented as above. Repeat lactic acid despite the fact that the patient had already received 4 L of IV fluid was still elevated at 2.1. Fever control continue to be difficult as well as the patient had been febrile through Toradol as well as Tylenol and the balance of the Toradol was given for this. .  In light of the above-mentioned, case was discussed directly with the hospitalist.  Telephone call was placed in approximately [phone]. Case was discussed with Dr. Shu Jackson. He has concerns that this patient does not meet admission criteria and suggested the patient be given another liter of fluid and observed here in the emergency department setting. I did do as he had asked. The hospitalist was updated approximately 3:40 AM to the patient's condition. I then also discussed the above-mentioned with the attending physician currently in the emergency department setting. Dr. Pritchard has been updated to the events of the day. As it is the end of my shift, my attending physician will follow up on the outstanding test results and assume the final disposition of this patient.   Please see attending physician note for further medical decision making, consultations, and final disposition of this patient. At the present time the patient's disposition continues to be pending. FINAL IMPRESSION      1. SIRS (systemic inflammatory response syndrome) (HCC)    2. Acute febrile illness    3. Cough    4. Hypotension, unspecified hypotension type    5.  Hypokalemia             (Please note that portions ofthis note were completed with a voice recognition program.  Efforts were made to edit the dictations but occasionally words are mis-transcribed.)    Faye Layton PA-C (electronically signed)            Dread Cummings PA-C  06/26/19 [id number]

## 2019-06-26 NOTE — ED NOTES
Pt updated on plan of care, denies needs at this time. c all light within reach.       Javier Smith RN  06/26/19 0036

## 2019-06-27 ENCOUNTER — APPOINTMENT (OUTPATIENT)
Dept: GENERAL RADIOLOGY | Age: 34
End: 2019-06-27
Payer: COMMERCIAL

## 2019-06-27 ENCOUNTER — HOSPITAL ENCOUNTER (EMERGENCY)
Age: 34
Discharge: HOME | End: 2019-06-27
Attending: EMERGENCY MEDICINE
Payer: COMMERCIAL

## 2019-06-27 VITALS
HEIGHT: 64 IN | TEMPERATURE: 100.2 F | DIASTOLIC BLOOD PRESSURE: 66 MMHG | HEART RATE: 56 BPM | WEIGHT: 130 LBS | SYSTOLIC BLOOD PRESSURE: 96 MMHG | RESPIRATION RATE: 16 BRPM | BODY MASS INDEX: 22.2 KG/M2 | OXYGEN SATURATION: 99 %

## 2019-06-27 DIAGNOSIS — J06.9 ACUTE UPPER RESPIRATORY INFECTION: Primary | ICD-10-CM

## 2019-06-27 DIAGNOSIS — J02.9 ACUTE PHARYNGITIS, UNSPECIFIED ETIOLOGY: ICD-10-CM

## 2019-06-27 LAB
A/G RATIO: 1.3 (ref 1.1–2.2)
ALBUMIN SERPL-MCNC: 3.8 G/DL (ref 3.4–5)
ALP BLD-CCNC: 121 U/L (ref 40–129)
ALT SERPL-CCNC: 87 U/L (ref 10–40)
ANION GAP SERPL CALCULATED.3IONS-SCNC: 10 MMOL/L (ref 3–16)
AST SERPL-CCNC: 67 U/L (ref 15–37)
BASOPHILS ABSOLUTE: 0 K/UL (ref 0–0.2)
BASOPHILS RELATIVE PERCENT: 0.4 %
BILIRUB SERPL-MCNC: 0.8 MG/DL (ref 0–1)
BILIRUBIN URINE: NEGATIVE
BLOOD, URINE: NEGATIVE
BUN BLDV-MCNC: 4 MG/DL (ref 7–20)
CALCIUM SERPL-MCNC: 8.3 MG/DL (ref 8.3–10.6)
CHLORIDE BLD-SCNC: 104 MMOL/L (ref 99–110)
CLARITY: CLEAR
CO2: 22 MMOL/L (ref 21–32)
COLOR: YELLOW
CREAT SERPL-MCNC: 0.6 MG/DL (ref 0.9–1.3)
EKG ATRIAL RATE: 78 BPM
EKG DIAGNOSIS: NORMAL
EKG P AXIS: 58 DEGREES
EKG P-R INTERVAL: 154 MS
EKG Q-T INTERVAL: 350 MS
EKG QRS DURATION: 80 MS
EKG QTC CALCULATION (BAZETT): 399 MS
EKG R AXIS: -18 DEGREES
EKG T AXIS: 18 DEGREES
EKG VENTRICULAR RATE: 78 BPM
EOSINOPHILS ABSOLUTE: 0.6 K/UL (ref 0–0.6)
EOSINOPHILS RELATIVE PERCENT: 7.7 %
GFR AFRICAN AMERICAN: >60
GFR NON-AFRICAN AMERICAN: >60
GLOBULIN: 3 G/DL
GLUCOSE BLD-MCNC: 116 MG/DL (ref 70–99)
GLUCOSE URINE: NEGATIVE MG/DL
HCT VFR BLD CALC: 39.3 % (ref 40.5–52.5)
HEMOGLOBIN: 13.3 G/DL (ref 13.5–17.5)
KETONES, URINE: NEGATIVE MG/DL
LACTIC ACID: 1.3 MMOL/L (ref 0.4–2)
LEUKOCYTE ESTERASE, URINE: NEGATIVE
LIPASE: 32 U/L (ref 13–60)
LYMPHOCYTES ABSOLUTE: 0.9 K/UL (ref 1–5.1)
LYMPHOCYTES RELATIVE PERCENT: 12 %
MAGNESIUM: 2 MG/DL (ref 1.8–2.4)
MCH RBC QN AUTO: 32.5 PG (ref 26–34)
MCHC RBC AUTO-ENTMCNC: 33.8 G/DL (ref 31–36)
MCV RBC AUTO: 96.3 FL (ref 80–100)
MICROSCOPIC EXAMINATION: NORMAL
MONOCYTES ABSOLUTE: 0.7 K/UL (ref 0–1.3)
MONOCYTES RELATIVE PERCENT: 9.3 %
NEUTROPHILS ABSOLUTE: 5.1 K/UL (ref 1.7–7.7)
NEUTROPHILS RELATIVE PERCENT: 70.6 %
NITRITE, URINE: NEGATIVE
PDW BLD-RTO: 12.9 % (ref 12.4–15.4)
PH UA: 6.5 (ref 5–8)
PLATELET # BLD: 140 K/UL (ref 135–450)
PMV BLD AUTO: 9.4 FL (ref 5–10.5)
POTASSIUM SERPL-SCNC: 3.4 MMOL/L (ref 3.5–5.1)
PROTEIN UA: NEGATIVE MG/DL
RBC # BLD: 4.08 M/UL (ref 4.2–5.9)
S PYO THROAT QL CULT: NORMAL
SODIUM BLD-SCNC: 136 MMOL/L (ref 136–145)
SPECIFIC GRAVITY UA: <1.005 (ref 1–1.03)
TOTAL PROTEIN: 6.8 G/DL (ref 6.4–8.2)
URINE REFLEX TO CULTURE: NORMAL
URINE TYPE: NORMAL
UROBILINOGEN, URINE: 0.2 E.U./DL
WBC # BLD: 7.2 K/UL (ref 4–11)

## 2019-06-27 PROCEDURE — 80053 COMPREHEN METABOLIC PANEL: CPT

## 2019-06-27 PROCEDURE — 85025 COMPLETE CBC W/AUTO DIFF WBC: CPT

## 2019-06-27 PROCEDURE — 81003 URINALYSIS AUTO W/O SCOPE: CPT

## 2019-06-27 PROCEDURE — 83605 ASSAY OF LACTIC ACID: CPT

## 2019-06-27 PROCEDURE — 2580000003 HC RX 258: Performed by: PHYSICIAN ASSISTANT

## 2019-06-27 PROCEDURE — 6370000000 HC RX 637 (ALT 250 FOR IP): Performed by: PHYSICIAN ASSISTANT

## 2019-06-27 PROCEDURE — 96361 HYDRATE IV INFUSION ADD-ON: CPT

## 2019-06-27 PROCEDURE — 93010 ELECTROCARDIOGRAM REPORT: CPT | Performed by: INTERNAL MEDICINE

## 2019-06-27 PROCEDURE — 71046 X-RAY EXAM CHEST 2 VIEWS: CPT

## 2019-06-27 PROCEDURE — 83735 ASSAY OF MAGNESIUM: CPT

## 2019-06-27 PROCEDURE — 93005 ELECTROCARDIOGRAM TRACING: CPT | Performed by: PHYSICIAN ASSISTANT

## 2019-06-27 PROCEDURE — 96360 HYDRATION IV INFUSION INIT: CPT

## 2019-06-27 PROCEDURE — 87040 BLOOD CULTURE FOR BACTERIA: CPT

## 2019-06-27 PROCEDURE — 99283 EMERGENCY DEPT VISIT LOW MDM: CPT

## 2019-06-27 PROCEDURE — 83690 ASSAY OF LIPASE: CPT

## 2019-06-27 RX ORDER — 0.9 % SODIUM CHLORIDE 0.9 %
30 INTRAVENOUS SOLUTION INTRAVENOUS ONCE
Status: COMPLETED | OUTPATIENT
Start: 2019-06-27 | End: 2019-06-27

## 2019-06-27 RX ORDER — IBUPROFEN 800 MG/1
800 TABLET ORAL ONCE
Status: COMPLETED | OUTPATIENT
Start: 2019-06-27 | End: 2019-06-27

## 2019-06-27 RX ORDER — ONDANSETRON 4 MG/1
4-8 TABLET, ORALLY DISINTEGRATING ORAL EVERY 12 HOURS PRN
Qty: 12 TABLET | Refills: 0 | Status: SHIPPED | OUTPATIENT
Start: 2019-06-27 | End: 2020-01-27

## 2019-06-27 RX ORDER — AMOXICILLIN AND CLAVULANATE POTASSIUM 875; 125 MG/1; MG/1
1 TABLET, FILM COATED ORAL 2 TIMES DAILY
Qty: 20 TABLET | Refills: 0 | Status: SHIPPED | OUTPATIENT
Start: 2019-06-27 | End: 2019-07-07

## 2019-06-27 RX ORDER — ACETAMINOPHEN 500 MG
1000 TABLET ORAL ONCE
Status: COMPLETED | OUTPATIENT
Start: 2019-06-27 | End: 2019-06-27

## 2019-06-27 RX ADMIN — IBUPROFEN 800 MG: 800 TABLET, FILM COATED ORAL at 07:32

## 2019-06-27 RX ADMIN — ACETAMINOPHEN 1000 MG: 500 TABLET, FILM COATED ORAL at 07:32

## 2019-06-27 RX ADMIN — SODIUM CHLORIDE 1770 ML: 9 INJECTION, SOLUTION INTRAVENOUS at 07:32

## 2019-06-27 ASSESSMENT — ENCOUNTER SYMPTOMS
ABDOMINAL DISTENTION: 0
STRIDOR: 0
WHEEZING: 0
ABDOMINAL PAIN: 0
BACK PAIN: 0
DIARRHEA: 0
NAUSEA: 1
TROUBLE SWALLOWING: 0
SORE THROAT: 1
SHORTNESS OF BREATH: 0
COLOR CHANGE: 0
VOMITING: 1
CONSTIPATION: 0
VOICE CHANGE: 0
COUGH: 1

## 2019-06-27 ASSESSMENT — PAIN DESCRIPTION - LOCATION: LOCATION: HEAD

## 2019-06-27 ASSESSMENT — PAIN DESCRIPTION - PAIN TYPE: TYPE: ACUTE PAIN

## 2019-06-27 ASSESSMENT — PAIN SCALES - GENERAL: PAINLEVEL_OUTOF10: 5

## 2019-06-27 NOTE — ED PROVIDER NOTES
This patient was seen by the Mid-Level Provider. I have seen and examined the patient, agree with the workup, evaluation, management and diagnosis. Care plan has been discussed. My assessment reveals a 57-year-old male who presents with fever. This is a 57-year-old male who presents with a fever he has had for the last several days. Patient was actually seen here yesterday for his fever with an unremarkable exam.  Patient complains of an occasional cough and occasional sore throat. He denies any significant pain at this time. He denies any other source of infection. He denies abdominal pain. The patient's work-up has been extensive and unremarkable. We have followed lactic acids that have been improving. His work-up again today is unremarkable with no obvious source. His chest x-ray shows no acute findings. However, given the patient's recurrent fever he will be treated empirically for possible pulmonary source. He will be discharged with appropriate follow-up with instructed to return if worse. Exam: Well-nourished male in no acute distress. He was nontoxic-appearing. His oropharynx is clear with no erythema or exudate. His chest was clear to auscultation bilaterally. MDM: This is a 57-year-old male with a history of a fever. There is no obvious source. However, the patient does not appear to be toxic and appears to be improving by lab work. He will be discharged with prompt follow-up and instructed to return if worse.     Results for orders placed or performed during the hospital encounter of 06/27/19   CBC Auto Differential   Result Value Ref Range    WBC 7.2 4.0 - 11.0 K/uL    RBC 4.08 (L) 4.20 - 5.90 M/uL    Hemoglobin 13.3 (L) 13.5 - 17.5 g/dL    Hematocrit 39.3 (L) 40.5 - 52.5 %    MCV 96.3 80.0 - 100.0 fL    MCH 32.5 26.0 - 34.0 pg    MCHC 33.8 31.0 - 36.0 g/dL    RDW 12.9 12.4 - 15.4 %    Platelets 497 035 - 235 K/uL    MPV 9.4 5.0 - 10.5 fL    Neutrophils % 70.6 %    Lymphocytes % abnormalities are detected. Abdomen/Pelvis: Organs: Again, evaluation is limited without intravenous contrast.  Having said that, no acute or suspicious hepatic or splenic abnormality is identified. Normal adrenals. Normal noncontrast imaging of the pancreas. No acute renal abnormalities are identified. No renal or ureteral calculi are found. GI/Bowel: No large bowel abnormalities are detected. Appendix is unremarkable in appearance. Hyperdense material within the stomach is noted layering dependently, presumably something the patient has ingested (such as milk of magnesia). Otherwise, the stomach and duodenal sweep are unremarkable in appearance. No small bowel abnormalities are identified. Pelvis: Urinary bladder and prostate are unremarkable. Peritoneum/Retroperitoneum: Abdominal aorta is normal in caliber. No retroperitoneal lymphadenopathy is identified. No iliac chain or inguinal lymphadenopathy. Bones/Soft Tissues: No suspicious osteolytic or osteoblastic bone lesions are identified. No acute bony abnormality detected. No acute abnormality detected within the chest, abdomen, and pelvis.  Limited without intravenous contrast.        Emilee Cee MD  06/27/19 [id number]

## 2019-06-27 NOTE — ED PROVIDER NOTES
[address] [facility]        Pt Name: Romeo Daniels  MRN: [id number]  [place] 1985  Date of evaluation: 6/27/2019  Provider: Tomasz Mayer PA-C  PCP: No primary care provider on file. This patient was seen and evaluated by the attending physician Dr. Vicenta Geronimo       Chief Complaint   Patient presents with    Fever     x3 days, recently seen for fever and has been taking ibuprofen and antibiotics with no improvement       HISTORY OF PRESENT ILLNESS   (Location/Symptom, Timing/Onset, Context/Setting, Quality, Duration, Modifying Factors, Severity)  Note limiting factors. Romeo Daniels is a 37 y.o. male who presents to ED for a second visit (most recently evaluated for the same presentation on 6/25/19) to address symptoms that include sore throat and cough. Patient states he has had a relatively dry nonproductive cough for more than a month. He has associated symptoms where it feels like something is sometimes stuck on the left side of his neck. He states it feels somewhat as a tickle. he has had associated fatigue as well as malaise. he reports fever and chills with occasional nausea with vomiting. he has occasional headache. this is often mild and generalized but not the worst  Headache of his life, thunderclap description or abrupt in onset. He is currently reporting his pain and discomfort to be 5 out of 10. He has not had any recent travel and denies sick exposures. Upon further questioning he denies a history of unilateral leg pain or swelling. He denies a history of travel. He denies any history of DVT and or PE. Has no additional risk factors for thromboembolic events. When he visited the ER on 6/25, admission was discussed and he was given dose of vancomycin and IV fluids. He felt well enough to go home but returns with worsening/persistent symptoms without improvement.  He says that he was not sent home with antibiotics because the hospitalist, who did not feel he met criteria for admission at that time, felt that this was likely a viral illness. Nursing Notes were all reviewed and agreed with or any disagreements were addressed  in the HPI. REVIEW OF SYSTEMS    (2-9 systems for level 4, 10 or more for level 5)     Review of Systems   Constitutional: Positive for chills, fatigue and fever. HENT: Positive for congestion and sore throat. Negative for drooling, ear discharge, ear pain, tinnitus, trouble swallowing and voice change. Eyes: Negative for visual disturbance. Respiratory: Positive for cough. Negative for shortness of breath, wheezing and stridor. Cardiovascular: Negative for chest pain, palpitations and leg swelling. Gastrointestinal: Positive for nausea and vomiting. Negative for abdominal distention, abdominal pain, constipation and diarrhea. Endocrine: Negative. Genitourinary: Negative. Musculoskeletal: Negative for back pain, neck pain and neck stiffness. Skin: Negative for color change, pallor, rash and wound. Neurological: Positive for headaches. Negative for dizziness, tremors, seizures, syncope, facial asymmetry, speech difficulty, weakness, light-headedness and numbness. Psychiatric/Behavioral: Negative for confusion. All other systems reviewed and are negative. Positives and Pertinent negatives as per HPI. Except as noted abovein the ROS, all other systems were reviewed and negative. PAST MEDICAL HISTORY   History reviewed. No pertinent past medical history. SURGICAL HISTORY   History reviewed. No pertinent surgical history.       CURRENTMEDICATIONS       Previous Medications    KETOROLAC (TORADOL) 10 MG TABLET    Take 1 tablet by mouth three times daily Discontinue all other NSAIDs during the course of treatment and resumed thereafter    NAPROXEN (NAPROSYN) 500 MG TABLET    Take 1 tablet by mouth 2 times daily as needed for Pain ALLERGIES     Patient has no known allergies. FAMILYHISTORY     History reviewed. No pertinent family history. SOCIAL HISTORY       Social History     Socioeconomic History    Marital status:      Spouse name: None    Number of children: None    Years of education: None    Highest education level: None   Occupational History    None   Social Needs    Financial resource strain: None    Food insecurity:     Worry: None     Inability: None    Transportation needs:     Medical: None     Non-medical: None   Tobacco Use    Smoking status: Never Smoker    Smokeless tobacco: Never Used   Substance and Sexual Activity    Alcohol use: Never     Frequency: Never    Drug use: Never    Sexual activity: None   Lifestyle    Physical activity:     Days per week: None     Minutes per session: None    Stress: None   Relationships    Social connections:     Talks on phone: None     Gets together: None     Attends [employer] service: None     Active member of club or organization: None     Attends meetings of clubs or organizations: None     Relationship status: None    Intimate partner violence:     Fear of current or ex partner: None     Emotionally abused: None     Physically abused: None     Forced sexual activity: None   Other Topics Concern    None   Social History Narrative    None       SCREENINGS             PHYSICAL EXAM    (up to 7 for level 4, 8 or more for level 5)     ED Triage Vitals   BP Temp Temp src Pulse Resp SpO2 Height Weight   -- -- -- -- -- -- -- --       Physical Exam   Constitutional: He is oriented to person, place, and time. He appears well-developed and well-nourished. No distress. HENT:   Head: Normocephalic and atraumatic. Right Ear: External ear normal.   Left Ear: External ear normal.   Nose: Nose normal.   Mouth/Throat: Oropharynx is clear and moist.   Eyes: Pupils are equal, round, and reactive to light.  Conjunctivae and EOM are normal. Right eye exhibits no discharge. Left eye exhibits no discharge. No scleral icterus. Neck: Normal range of motion. No JVD present. Cardiovascular: Regular rhythm. Tachycardia present. Exam reveals no gallop and no friction rub. No murmur heard. Pulmonary/Chest: Effort normal and breath sounds normal.   Abdominal: Soft. Bowel sounds are normal. He exhibits no distension. There is no tenderness. Musculoskeletal: Normal range of motion. Lymphadenopathy:     He has no cervical adenopathy. Neurological: He is alert and oriented to person, place, and time. No cranial nerve deficit. Skin: Skin is warm and dry. Capillary refill takes less than 2 seconds. No rash noted. He is not diaphoretic. No erythema. No pallor. Psychiatric: He has a normal mood and affect. His behavior is normal.   Nursing note and vitals reviewed.       DIAGNOSTIC RESULTS   LABS:    Labs Reviewed   CBC WITH AUTO DIFFERENTIAL - Abnormal; Notable for the following components:       Result Value    RBC 4.08 (*)     Hemoglobin 13.3 (*)     Hematocrit 39.3 (*)     Lymphocytes # 0.9 (*)     All other components within normal limits    Narrative:     Performed at:  [facility] [address], [place], [address]   Phone [phone]   COMPREHENSIVE METABOLIC PANEL - Abnormal; Notable for the following components:    Potassium 3.4 (*)     Glucose 116 (*)     BUN 4 (*)     CREATININE 0.6 (*)     ALT 87 (*)     AST 67 (*)     All other components within normal limits    Narrative:     Performed at:  [facility] [address], [place], [address]   Phone [phone]   CULTURE BLOOD #2   CULTURE BLOOD #1   LIPASE    Narrative:     Performed at:  [facility] [address], [place], [address]   Phone [phone]   URINE RT REFLEX TO CULTURE    Narrative:     Performed at:  [facility] [address], [place], [address] Phone [phone]   LACTIC ACID, PLASMA    Narrative:     Performed at:  [facility]  [address] [facility],  [place], [address] [facility]   Phone [phone]   MAGNESIUM    Narrative:     Performed at:  [facility]  [address] [facility],  [place], [address]   Phone [phone]   LACTIC ACID, PLASMA       All other labs were within normal range or not returned as of this dictation. EKG: All EKG's are interpreted by the Emergency Department Physician in the absence of a cardiologist.  Please see their note for interpretation of EKG. RADIOLOGY:   Non-plain film images such as CT, Ultrasound and MRI are read by the radiologist. Plain radiographic images are visualized andpreliminarily interpreted by the  ED Provider with the below findings:        Interpretation perthe Radiologist below, if available at the time of this note:    XR CHEST STANDARD (2 VW)   Final Result   No acute process. Xr Chest Standard (2 Vw)    Result Date: 6/25/2019  EXAMINATION: TWO XRAY VIEWS OF THE CHEST 6/25/2019 4:34 pm COMPARISON: None. HISTORY: ORDERING SYSTEM PROVIDED HISTORY: r/o pna TECHNOLOGIST PROVIDED HISTORY: Reason for exam:->r/o pna Ordering Physician Provided Reason for Exam: Cough (cough for a month. Now with fever and headache. ) Acuity: Unknown Type of Exam: Unknown FINDINGS: There is mild elevation of the left hemidiaphragm. The lungs are without acute focal process. There is no effusion or pneumothorax. The cardiomediastinal silhouette is without acute process. The osseous structures are without acute process. No acute process. Ct Soft Tissue Neck W Contrast    Result Date: 6/26/2019  EXAMINATION: CT OF THE NECK SOFT TISSUE WITH CONTRAST  6/25/2019 TECHNIQUE: CT of the neck was performed with the administration of intravenous contrast. Multiplanar reformatted images are provided for review.  Dose modulation, iterative reconstruction, and/or weight based adjustment of the mA/kV was utilized to reduce the radiation dose to as low as reasonably achievable. COMPARISON: None. HISTORY: ORDERING SYSTEM PROVIDED HISTORY: pain, fever TECHNOLOGIST PROVIDED HISTORY: Acuity: Unknown Type of Exam: Unknown FINDINGS: PHARYNX/LARYNX:  The palatine tonsils are normal in appearance. The tongue is normal in appearance. The valleculae, epiglottis, aryepiglottic folds and pyriform sinuses appear unremarkable. The true and false vocal cords are normal in appearance. No mass or abscess is seen. SALIVARY GLANDS/THYROID:  The parotid and submandibular glands appear unremarkable. The thyroid gland appears unremarkable. LYMPH NODES:  No cervical or supraclavicular lymphadenopathy is seen. SOFT TISSUES:  No appreciable soft tissue swelling or mass is seen. BRAIN/ORBITS/SINUSES:  The visualized portion of the intracranial contents appear unremarkable. The visualized portion of the orbits, paranasal sinuses and mastoid air cells demonstrate no acute abnormality. LUNG APICES/SUPERIOR MEDIASTINUM:  No focal consolidation is seen within the visualized lung apices. No superior mediastinal lymphadenopathy or mass. The visualized portion of the trachea appears unremarkable. BONES:  No aggressive appearing lytic or blastic bony lesion. No acute abnormality of the soft tissue structures of the neck. Ct Chest Abdomen Pelvis Wo Contrast    Result Date: 6/26/2019  EXAMINATION: CT OF THE CHEST, ABDOMEN, AND PELVIS WITHOUT CONTRAST 6/25/2019 7:58 pm TECHNIQUE: CT of the chest, abdomen and pelvis was performed without the administration of intravenous contrast. Multiplanar reformatted images are provided for review. Dose modulation, iterative reconstruction, and/or weight based adjustment of the mA/kV was utilized to reduce the radiation dose to as low as reasonably achievable.  COMPARISON: None HISTORY: ORDERING SYSTEM PROVIDED HISTORY: fever, HoTN - OK to use contrast from CT Neck TECHNOLOGIST PROVIDED HISTORY: Reason for exam:->fever, HoTN - OK to use contrast from CT Neck Additional Contrast?->None Ordering Physician Provided Reason for Exam: fever Acuity: Unknown Type of Exam: Unknown Relevant Medical/Surgical History: (cough for a month. Now with fever and headache. ) FINDINGS: Chest: Mediastinum: Evaluation is limited without intravenous contrast.  Visualized thyroid unremarkable. No mediastinal lymphadenopathy identified. Noncontrast imaging of the cardiac chambers unremarkable. Noncontrast imaging of the thoracic aorta and pulmonary arteries is unremarkable. Lungs/pleura: Mild dependent atelectasis. Lungs are otherwise clear. Soft Tissues/Bones: No suspicious osteolytic or osteoblastic bone lesions. Bone island is noted in the right glenoid. No acute bony abnormalities are detected. Abdomen/Pelvis: Organs: Again, evaluation is limited without intravenous contrast.  Having said that, no acute or suspicious hepatic or splenic abnormality is identified. Normal adrenals. Normal noncontrast imaging of the pancreas. No acute renal abnormalities are identified. No renal or ureteral calculi are found. GI/Bowel: No large bowel abnormalities are detected. Appendix is unremarkable in appearance. Hyperdense material within the stomach is noted layering dependently, presumably something the patient has ingested (such as milk of magnesia). Otherwise, the stomach and duodenal sweep are unremarkable in appearance. No small bowel abnormalities are identified. Pelvis: Urinary bladder and prostate are unremarkable. Peritoneum/Retroperitoneum: Abdominal aorta is normal in caliber. No retroperitoneal lymphadenopathy is identified. No iliac chain or inguinal lymphadenopathy. Bones/Soft Tissues: No suspicious osteolytic or osteoblastic bone lesions are identified. No acute bony abnormality detected. No acute abnormality detected within the chest, abdomen, and pelvis.  Limited without pulmonary edema, pleural effusion, pericardial effusion, cardiac tamponade, cardiomyopathy, CHF exacerbation, thoracic aortic dissection, esophageal rupture, other life-threatening arrhythmia, hypertensive urgency or emergency, hemothorax, pulmonary contusion, subcutaneous emphysema, flail chest, pneumo mediastinum, rib fracture, peritonsillar or tonsillar abscess, retropharyngeal abscess, bacterial tracheitis, epiglottitis, meningitis, encephalitis, foreign body, angioedema, anaphylaxis, mononucleosis, mumps, lymphoma, leukemia, asthma exacerbation, osteomyelitis, mastoiditis, sepsis, DKA, or other concerning pathology. Patient is advised to follow-up with PCP for recheck and may return to ED per discharge instructions. We have addressed concerns and expectations. FINAL IMPRESSION      1. Acute upper respiratory infection    2. Acute pharyngitis, unspecified etiology          DISPOSITION/PLAN   DISPOSITION  Decision to discharge      PATIENT REFERREDTO:  [address], [address] [address] [facility],[address]  [phone]      for ENT follow up      DISCHARGE MEDICATIONS:  New Prescriptions    AMOXICILLIN-CLAVULANATE (AUGMENTIN) 875-125 MG PER TABLET    Take 1 tablet by mouth 2 times daily for 10 days    ONDANSETRON (ZOFRAN ODT) 4 MG DISINTEGRATING TABLET    Take 1-2 tablets by mouth every 12 hours as needed for Nausea May Sub regular tablet (non-ODT) if insurance does not cover ODT.        DISCONTINUED MEDICATIONS:  Discontinued Medications    No medications on file              (Please note that portions ofthis note were completed with a voice recognition program.  Efforts were made to edit the dictations but occasionally words are mis-transcribed.)    Rajendra Myers PA-C (electronically signed)           Rajendra Myers PA-C  06/27/19 1011

## 2019-06-30 LAB
BLOOD CULTURE, ROUTINE: NORMAL
CULTURE, BLOOD 2: NORMAL

## 2019-07-01 ENCOUNTER — HOSPITAL ENCOUNTER (OUTPATIENT)
Dept: PHYSICAL THERAPY | Age: 34
Setting detail: THERAPIES SERIES
Discharge: HOME | End: 2019-07-01
Payer: COMMERCIAL

## 2019-07-01 PROCEDURE — 97140 MANUAL THERAPY 1/> REGIONS: CPT

## 2019-07-01 PROCEDURE — 97012 MECHANICAL TRACTION THERAPY: CPT

## 2019-07-01 PROCEDURE — 97110 THERAPEUTIC EXERCISES: CPT

## 2019-07-02 ENCOUNTER — APPOINTMENT (OUTPATIENT)
Dept: PHYSICAL THERAPY | Age: 34
End: 2019-07-02
Payer: COMMERCIAL

## 2019-07-02 LAB — BLOOD CULTURE, ROUTINE: NORMAL

## 2019-07-08 ENCOUNTER — HOSPITAL ENCOUNTER (OUTPATIENT)
Dept: PHYSICAL THERAPY | Age: 34
Setting detail: THERAPIES SERIES
Discharge: HOME | End: 2019-07-08
Payer: COMMERCIAL

## 2019-07-08 PROCEDURE — 97012 MECHANICAL TRACTION THERAPY: CPT

## 2019-07-08 PROCEDURE — 97140 MANUAL THERAPY 1/> REGIONS: CPT

## 2019-07-08 PROCEDURE — 97110 THERAPEUTIC EXERCISES: CPT

## 2019-07-09 ENCOUNTER — APPOINTMENT (OUTPATIENT)
Dept: PHYSICAL THERAPY | Age: 34
End: 2019-07-09
Payer: COMMERCIAL

## 2019-07-15 ENCOUNTER — HOSPITAL ENCOUNTER (OUTPATIENT)
Dept: PHYSICAL THERAPY | Age: 34
Setting detail: THERAPIES SERIES
Discharge: HOME | End: 2019-07-15
Payer: COMMERCIAL

## 2019-07-15 PROCEDURE — 97140 MANUAL THERAPY 1/> REGIONS: CPT

## 2019-07-15 PROCEDURE — 97110 THERAPEUTIC EXERCISES: CPT

## 2019-07-15 NOTE — FLOWSHEET NOTE
pain free range, UT and LS stretches     Manual Treatments:    7/15: DTM and trigger point release to L UT/LS x 8 mins  7/8: hawkgrips #9 to L scalenes, manual release to L scalenes, SOR x 10 minutes   7/1, 6/24: cerv spine PROM, PA mobs through c spine and upper t spine grade 3, STM to cerv paraspinals and B UTs x 8 minutes  6/18: PROM c spine all directions, trigger point release to B UT (L>R), manual cerv traction in neutral , SOR, maximal gapping on L, PA mobs throughout c spine grade 3, side glides B grade 2 x 14 minutes   6/10/19: PROM c spine, side glides grade 2, PA mobs grade 3, rot mobs to correct upper R rotated cerv vert, SOR, manual cerv traction x 10 minutes     Modalities:    7/15: MPH to cerv spine in supine x 10 mins   7/8, 7/1, 6/24: Patient was positioned supine on traction table with bolsters under bilateral knees with head/neck in traction device. Parameters were as follows: 20/10 max/min pounds with on/off ratio of 30/10, for a total of 10 minutes. Patient was given panic button as well as instructed how to use it if experiencing pain. Patient was also given bell to call if anything is needed.    6/10/19: none, consider mech cerv traction    Timed Code Treatment Minutes:  32    Total Treatment Minutes:  42    Treatment/Activity Tolerance:  [x] Patient tolerated treatment well [] Patient limited by fatigue  [] Patient limited by pain  [] Patient limited by other medical complications  [x] HUCXE:49% improved, however one kenan trigger point remains on L    Prognosis: [x] Good [] Fair  [] Poor    Patient Requires Follow-up: [x] Yes  [] No    Plan:   [x] Continue per plan of care [] Alter current plan (see comments)  [] Plan of care initiated [] Hold pending MD visit [] Discharge    Plan for Next Session:  Mobility of cerv spine, flexibility of B UEs and cervicothoracic musculature, manual PRN, MOC, UE strength and  strength     Electronically signed by:  Robert Moore, PT, DPT

## 2019-07-16 ENCOUNTER — APPOINTMENT (OUTPATIENT)
Dept: PHYSICAL THERAPY | Age: 34
End: 2019-07-16
Payer: COMMERCIAL

## 2019-07-18 ENCOUNTER — HOSPITAL ENCOUNTER (OUTPATIENT)
Dept: PHYSICAL THERAPY | Age: 34
Setting detail: THERAPIES SERIES
Discharge: HOME | End: 2019-07-18
Payer: COMMERCIAL

## 2019-07-18 PROCEDURE — 97161 PT EVAL LOW COMPLEX 20 MIN: CPT

## 2019-07-18 PROCEDURE — 97140 MANUAL THERAPY 1/> REGIONS: CPT

## 2019-07-18 PROCEDURE — 97110 THERAPEUTIC EXERCISES: CPT

## 2019-07-18 NOTE — PLAN OF CARE
[place], [facility], [address]  Phone: [phone]   Fax: [phone]     Bryce Abarca    Dear Dr. Abby Putnam,    We had the pleasure of evaluating the following patient for physical therapy services at [address] [place] [address]. A summary of our findings can be found in the initial assessment below. This includes our plan of care. If you have any questions or concerns regarding these findings, please do not hesitate to contact me at the office phone number checked above. Thank you for the referral.       Physician Signature:_______________________________Date:__________________  By signing above (or electronic signature), therapists plan is approved by physician      Patient: Anya Bob   : 1985   MRN: [id number]  Referring Physician: Referring Practitioner: Abby Putnam      Evaluation Date: 2019      Medical Diagnosis Information:  Diagnosis: M53.3 (ICD-10-CM) - Sacroiliac joint dysfunction, M47.817 (ICD-10-CM) - Lumbosacral spondylosis without myelopathy, M54.5 (ICD-10-CM) - Mechanical low back pain   Treatment Diagnosis: Pain in lower thoracic spine, decreased ROM, decreased tolerance to hold/initiate functional positions                                         Insurance information: PT Insurance Information: [employer]      Precautions/ Contra-indications: None  Latex Allergy:  [x]NO      []YES  Preferred Language for Healthcare:   [x]English       []other:    SUBJECTIVE: Pt states that his low back pain started about 3 months ago. Pt slept 2 nights on a  and after that he has had pain directly over 1 SP in thoracic spine. Pain exacerbated after standing for long periods and also when sit down initially. After 5-10 mins the high pain goes from 8/10-2/10 when sitting. No radiation down legs. Denies bowel and bladder changes. No N/T in saddle area.  Pt states that he had a fever a that may include: thermal agents, E-stim, Biofeedback, US, iontophoresis as indicated  [x]  Patient education on joint protection, postural re-education, activity modification, progression of HEP. HEP instruction: Written HEP instructions provided and reviewed (see scanned image in ). GOALS:  Patient stated goal:     Therapist goals for Patient:   Short Term Goals: To be achieved in: 2 weeks  1. Independent in HEP and progression per patient tolerance, in order to prevent re-injury. 2. Patient will have a decrease in pain to facilitate improvement in movement, function, and ADLs as indicated by Functional Deficits. Long Term Goals: To be achieved in: 6 weeks  1. Disability index score of 5% or less for the SERA to assist with reaching prior level of function. 2. Patient will demonstrate increased AROM to WNL, good LS mobility, good hip ROM to allow for proper joint functioning as indicated by patients Functional Deficits. 3. Patient will demonstrate an increase in Strength to 4+/5 or more in proximal hip with good core activation to allow for proper functional mobility as indicated by patients Functional Deficits. 4. Patient will return to functional activities including standing for prolonged periods and sitting without increased symptoms or restriction. Electronically signed by:  Michael Huerta PT    Oleg Almeida, SPT    Therapist was present, directed the patient's care, made skilled judgement, and was responsible for assessment and treatment of the patient.

## 2019-07-18 NOTE — FLOWSHEET NOTE
[address] [facility]    Physical Therapy Daily Treatment Note  Date:  2019    Patient Name:  Marco Hayes    :  1985  MRN: [id number]  Medical/Treatment Diagnosis Information:  Diagnosis: M53.3 (ICD-10-CM) - Sacroiliac joint dysfunction, M47.817 (ICD-10-CM) - Lumbosacral spondylosis without myelopathy, M54.5 (ICD-10-CM) - Mechanical low back pain  Treatment Diagnosis: Pain in lower thoracic spine, decreased ROM, decreased tolerance to hold/initiate functional positions  Insurance/Certification information:  PT [facility] Information: [facility]   Physician Information:  Referring Practitioner: Hayder Covert of care signed (Y/N):     Date of Patient follow up with Physician:     Progress Note: [x]  Yes  []  No  Next due by: Visit #10      Latex Allergy:  [x]NO      []YES  Preferred Language for Healthcare:   [x]English       []other:    Visit # Insurance Allowable Date Range   1 30 NA     Pain level:  2/10     SUBJECTIVE:  See eval    OBJECTIVE: See eval   Observation:    Test measurements:      RESTRICTIONS/PRECAUTIONS:     Exercises/Interventions:     Therapeutic Exercise (11482)  12' Resistance / level Sets / Seconds Reps Notes / Cues   Quadruped L rotations  2 10 Added  Cued for proper movement   Bird Dog UE only (Alternating)  2 10 Added .  Cued for core activation   Prone Press up  3\" holds  2 10 Added 7          NPV assess multifidi activation and do cable column rotation                                   Therapeutic Activities (87519)                                          Neuromuscular Re-ed (25553)                                          Manual Intervention ([id number]) 12'       Prone PA       GISTM/STM L paraspinals T10-L2      Lumbar/thoracic Mobs R TP UPA T10-L2      SI Manip       Hip belt mobs       Hip LA distraction                              Pt. Education:  -pt educated on diagnosis, prognosis and expectations for rehab  -pt

## 2019-07-22 ENCOUNTER — HOSPITAL ENCOUNTER (OUTPATIENT)
Dept: PHYSICAL THERAPY | Age: 34
Setting detail: THERAPIES SERIES
Discharge: HOME | End: 2019-07-22
Payer: COMMERCIAL

## 2019-07-22 PROCEDURE — 97140 MANUAL THERAPY 1/> REGIONS: CPT

## 2019-07-22 PROCEDURE — 97110 THERAPEUTIC EXERCISES: CPT

## 2019-07-22 NOTE — FLOWSHEET NOTE
Hip LA distraction                              Pt. Education:  -pt educated on diagnosis, prognosis and expectations for rehab  -pt provided with written and illustrated instructions for HEP  -all pt questions were answered    Therapeutic Exercise and NMR EXR  - (54521) Provided verbal/tactile cueing for activities related to strengthening, flexibility, endurance, ROM  for improvements in proximal hip and core control with self care, mobility, lifting and ambulation.  - (55813) Provided verbal/tactile cueing for activities related to improving balance, coordination, kinesthetic sense, posture, motor skill, proprioception  to assist with core control in self care, mobility, lifting, and ambulation.      Therapeutic Activities:    - (76509 or 67656) Provided verbal/tactile cueing for activities related to improving balance, coordination, kinesthetic sense, posture, motor skill, proprioception and motor activation to allow for proper function  with self care and ADLs  - (11649) Provided training and instruction to the patient for proper core and proximal hip recruitment and positioning with ambulation re-education     Home Exercise Program:    - (67658) Reviewed/Progressed HEP activities related to strengthening, flexibility, endurance, ROM of core, proximal hip and LE for functional self-care, mobility, lifting and ambulation   - (48520) Reviewed/Progressed HEP activities related to improving balance, coordination, kinesthetic sense, posture, motor skill, proprioception of core, proximal hip and LE for self care, mobility, lifting, and ambulation      Manual Treatments:  PROM / STM / Oscillations-Mobs:  G-I, II, III, IV (PA's, Inf., Post.)  - (82010) Provided manual therapy to mobilize proximal hip and LS spine soft tissue/joints for the purpose of modulating pain, promoting relaxation,  increasing ROM, reducing/eliminating soft tissue swelling/inflammation/restriction, improving soft tissue extensibility and Limitations/Impairments:  -Sitting -Standing   -Walking -Squatting/bending    -Stairs -ADL's    -Transfers -Reaching  -Housework -Job related tasks  -Driving -Sports/Recreation   -Sleeping -Other:    ASSESSMENT:  Upgrades to program made per bold on flow sheet. Pt demonstrates weakness and limited mobility throughout thoracic spine with cueing needed repeatedly for proper mechanics and to maintain core stability. Pt struggled with planks due to decreased strength and endurance but maintained good form throughout. Pt also struggled with cable column rotations with cueing needed to limit shoulder elevation compensation and to maintain tight core and proper rotation. Tightness and stiffness noted throughout thoracic spine with manual joint mobs and STM this date. Pt will continue to benefit from skilled therapy to promote increased strength and motion as well as increased stability in spine for decreased pain with sitting/standing and to improve tolerance to work and driving tasks as well as other functional daily activity without restrictions. Treatment/Activity Tolerance:  - Patient able to complete tx  - Patient limited by fatique  - Patient limited by pain  - Patient limited by other medical complications  - Other:     Prognosis: - Good - Fair  - Poor    Patient Requires Follow-up: - Yes  - No    PLAN: See eval. PT 2x / week for 4-6 weeks.    - Continue per plan of care - Alter current plan (see comments)  - Plan of care initiated - Hold pending MD visit - Discharge    Electronically signed by: Alen [id number]

## 2019-07-23 ENCOUNTER — HOSPITAL ENCOUNTER (OUTPATIENT)
Dept: PHYSICAL THERAPY | Age: 34
Setting detail: THERAPIES SERIES
Discharge: HOME | End: 2019-07-23
Payer: COMMERCIAL

## 2019-07-23 PROCEDURE — 97110 THERAPEUTIC EXERCISES: CPT

## 2019-07-23 NOTE — FLOWSHEET NOTE
cervicothoracic musculature, manual PRN, MOC, UE strength and  strength     Electronically signed by:  Manasa Emery, PT, DPT

## 2019-07-23 NOTE — DISCHARGE SUMMARY
Outpatient Physical Therapy    Phone: [phone] Fax: [phone]    Physical Therapy Progress/Discharge Note  Date: 2019        Patient Name:  Charisse Vieyra    :  1985  MRN: [id number]  Restrictions/Precautions:    Medical/Treatment Diagnosis Information:   · Diagnosis: cervicalgia  · Treatment Diagnosis: decreased cerv AROM, increased tension in cervicothoracic musculature, malalginment of cerv vertebrae, and increased pain     Tracking Information:       Physician Information Referring Practitioner: CARLENE English     Plan of Care Sent Date: 6/10/19 Signed Received:    Visit Count / Total Visits  7/10     Insurance Approved Visits  30 Approved Dates:     Insurance Information PT Insurance Information: [employer] (30 visit limit)     Progress Note/G-codes   [x]  Yes                 []  No Next Due: none      Pain level:      1/10           Time Period for Report: 6/10/19-19   Cancels/No-shows to date:      Plan of Care/Treatment to date:  X Therapeutic Exercise      X Modalities:  X Therapeutic Activity       ? Ultrasound    ? Gait Training        ? Cervical Traction   X Neuromuscular Re-education      X Cold/hotpack    X Instruction in HEP        ? Lumbar Traction  X Manual Therapy        ? Electrical Stimulation            ? Aquatic Therapy        ? Iontophoresis            ? Lymphedema management  ? Women's Health     Other:  ? Vestibular Rehab        ?    ?  Needed                        Significant Findings At Last Visit/Comments:    Subjective:    Pt reports he is ready for DC today. No limitations or pain at home or work. Does have a little tightness intermittently in his muscle on the L side of his neck still.  Plans to use the [facility] and complete stretches post DC.      Objective:            Spine  Cervical: sitting: flexion 55 deg, ext 55 deg, SBing L 45 deg, SBing R 55 deg, rot R 35 deg, rot L 45 deg  Strength RUE  R Shoulder Flexion: 5/5  R Shoulder ABduction: 5/5  R Shoulder Internal Rotation: 5/5  R Shoulder External Rotation: 5/5  R Elbow Flexion: 5/5  R Elbow Extension: 5/5  Strength VINICIUS  L Shoulder Flexion: 5/5  L Shoulder ABduction: 5/5  L Shoulder Internal Rotation: 5/5  L Shoulder External Rotation: 5/5  L Elbow Flexion: 5/5  L Elbow Extension: 5/5  Strength Other  Other: R  strength 70#, 70#, 65#  L  strength: 50#, 45#, 40#      Very minor trigger point remaining in L UT, no cervical malalignment          Functional Outcome Measures:          Neck Disability Index Raw Score: 4        Assessment:  Conditions Requiring Skilled Therapeutic Intervention  Body structures, Functions, Activity limitations: Decreased functional mobility , Decreased ADL status, Decreased ROM, Decreased strength, Decreased high-level IADLs, Increased Pain  Assessment: Pt is a 37 y/o male who presented with L sided neck pain. He has been seen for 7 visits as of this date and made great progress. He is now sleeping well, better able to concentrate, and move his neck without pain. His L GHJ strength also grealy improved and he is not limited with ADLs or community activites at this time. Pt has been taught a comprehensive HEP and plans to work out in the [facility] post DC. Treatment Diagnosis: Pain in lower thoracic spine, decreased ROM, decreased tolerance to hold/initiate functional positions  REQUIRES PT FOLLOW UP: No    Plan:       Discharge       Progress towards goals:    Long term goals  Time Frame for Long term goals : 5 weeks:  Long term goal 1: Pt will increase L  strength by at least 20# to progress towards return to PLOF. - MET  Long term goal 2: Pt will present to one or more visits without cervical vertebral malalignment to ensure proper positioning at home.  - MET  Long term goal 3: Pt will report pain at 1/10 in neck and shoulder to progress towards to no pain with daily activities or driving. - MET  Long term goal 4: Pt will be I with HEP to avoid future injury and maintain correct alignment. - MET    Current Frequency/Duration:  # Days per week: ? 1 day # Weeks: ? 1 week ? 4 weeks      X 2 days   ? 2 weeks X 5 weeks      ? 3 days   ? 3 weeks ? 6 weeks     Rehab Potential: ? Excellent X Good ? Fair  ? Poor     Goal Status:  X Achieved ? Partially Achieved  ? Not Achieved     Patient Status: ? Continue per initial plan of Care     X Patient now discharged     ? Additional visits requested, Please re-certify for additional visits:      Requested frequency/duration:  X/week for weeks    Electronically signed by:  Quentin Abraham PT, DPT    If you have any questions or concerns, please don't hesitate to call.   Thank you for your referral.    Physician Signature:________________________________Date:__________________  By signing above, therapists plan is approved by physician

## 2019-07-25 ENCOUNTER — OFFICE VISIT (OUTPATIENT)
Dept: PRIMARY CARE CLINIC | Age: 34
End: 2019-07-25
Payer: COMMERCIAL

## 2019-07-25 VITALS
SYSTOLIC BLOOD PRESSURE: 115 MMHG | BODY MASS INDEX: 22.53 KG/M2 | OXYGEN SATURATION: 100 % | HEART RATE: 67 BPM | TEMPERATURE: 98.1 F | WEIGHT: 132 LBS | HEIGHT: 64 IN | DIASTOLIC BLOOD PRESSURE: 76 MMHG

## 2019-07-25 DIAGNOSIS — R09.89 FOREIGN BODY SENSATION IN THROAT: ICD-10-CM

## 2019-07-25 DIAGNOSIS — Z11.4 SCREENING FOR HIV WITHOUT PRESENCE OF RISK FACTORS: ICD-10-CM

## 2019-07-25 DIAGNOSIS — Z00.00 PHYSICAL EXAM: Primary | ICD-10-CM

## 2019-07-25 PROBLEM — R09.A2 FOREIGN BODY SENSATION IN THROAT: Status: ACTIVE | Noted: 2019-07-25

## 2019-07-25 PROCEDURE — G8427 DOCREV CUR MEDS BY ELIG CLIN: HCPCS | Performed by: INTERNAL MEDICINE

## 2019-07-25 PROCEDURE — 90716 VAR VACCINE LIVE SUBQ: CPT | Performed by: INTERNAL MEDICINE

## 2019-07-25 PROCEDURE — 1036F TOBACCO NON-USER: CPT | Performed by: INTERNAL MEDICINE

## 2019-07-25 PROCEDURE — 90471 IMMUNIZATION ADMIN: CPT | Performed by: INTERNAL MEDICINE

## 2019-07-25 PROCEDURE — 99203 OFFICE O/P NEW LOW 30 MIN: CPT | Performed by: INTERNAL MEDICINE

## 2019-07-25 PROCEDURE — G8420 CALC BMI NORM PARAMETERS: HCPCS | Performed by: INTERNAL MEDICINE

## 2019-07-25 ASSESSMENT — PATIENT HEALTH QUESTIONNAIRE - PHQ9
1. LITTLE INTEREST OR PLEASURE IN DOING THINGS: 0
SUM OF ALL RESPONSES TO PHQ QUESTIONS 1-9: 0
SUM OF ALL RESPONSES TO PHQ QUESTIONS 1-9: 0
2. FEELING DOWN, DEPRESSED OR HOPELESS: 0
SUM OF ALL RESPONSES TO PHQ9 QUESTIONS 1 & 2: 0

## 2019-07-25 ASSESSMENT — ENCOUNTER SYMPTOMS
GASTROINTESTINAL NEGATIVE: 1
EYE DISCHARGE: 0
EYE REDNESS: 0
RESPIRATORY NEGATIVE: 1
PHOTOPHOBIA: 0
EYE PAIN: 0
EYE ITCHING: 0

## 2019-07-25 NOTE — PROGRESS NOTES
kg)   06/27/19 130 lb (59 kg)   06/25/19 131 lb (59.4 kg)     BP Readings from Last 3 Encounters:   06/27/19 96/66   06/26/19 104/67         Physical Exam   Constitutional: He is oriented to person, place, and time. He appears well-developed and well-nourished. HENT:   Head: Normocephalic and atraumatic. Eyes: Pupils are equal, round, and reactive to light. Conjunctivae and EOM are normal.   Neck: Normal range of motion. Neck supple. No thyromegaly present. Cardiovascular: Normal rate, regular rhythm and normal heart sounds. Pulmonary/Chest: Effort normal and breath sounds normal.   Musculoskeletal: Normal range of motion. Lymphadenopathy:     He has no cervical adenopathy. Neurological: He is alert and oriented to person, place, and time. Skin: Skin is warm and dry. Psychiatric: He has a normal mood and affect.  His behavior is normal. Judgment and thought content normal.       Lab Review   Admission on 06/27/2019, Discharged on 06/27/2019   Component Date Value    Ventricular Rate 06/27/2019 78     Atrial Rate 06/27/2019 78     P-R Interval 06/27/2019 154     QRS Duration 06/27/2019 80     Q-T Interval 06/27/2019 350     QTc Calculation (Bazett) 06/27/2019 399     P Axis 06/27/2019 58     R Axis 06/27/2019 -18     T Axis 06/27/2019 18     Diagnosis 06/27/2019 Normal sinus rhythmMinimal voltage criteria for LVH, may be normal variantOtherwise normal ECGNo previous ECGs availableConfirmed by Phylicia Connors MD, PEREZ ([id number]) on 6/27/2019 11:04:00 AM     WBC 06/27/2019 7.2     RBC 06/27/2019 4.08*    Hemoglobin 06/27/2019 13.3*    Hematocrit 06/27/2019 39.3*    MCV 06/27/2019 96.3     MCH 06/27/2019 32.5     MCHC 06/27/2019 33.8     RDW 06/27/2019 12.9     Platelets 89/33/2779 140     MPV 06/27/2019 9.4     Neutrophils % 06/27/2019 70.6     Lymphocytes % 06/27/2019 12.0     Monocytes % 06/27/2019 9.3     Eosinophils % 06/27/2019 7.7     Basophils % 06/27/2019 0.4     Neutrophils #

## 2019-07-29 ENCOUNTER — APPOINTMENT (OUTPATIENT)
Dept: PHYSICAL THERAPY | Age: 34
End: 2019-07-29
Payer: COMMERCIAL

## 2019-07-30 DIAGNOSIS — Z11.4 SCREENING FOR HIV WITHOUT PRESENCE OF RISK FACTORS: ICD-10-CM

## 2019-07-30 DIAGNOSIS — Z00.00 PHYSICAL EXAM: ICD-10-CM

## 2019-07-30 LAB
A/G RATIO: 1.6 (ref 1.1–2.2)
ALBUMIN SERPL-MCNC: 4.8 G/DL (ref 3.4–5)
ALP BLD-CCNC: 79 U/L (ref 40–129)
ALT SERPL-CCNC: 29 U/L (ref 10–40)
ANION GAP SERPL CALCULATED.3IONS-SCNC: 15 MMOL/L (ref 3–16)
AST SERPL-CCNC: 22 U/L (ref 15–37)
BASOPHILS ABSOLUTE: 0.1 K/UL (ref 0–0.2)
BASOPHILS RELATIVE PERCENT: 1.4 %
BILIRUB SERPL-MCNC: 1 MG/DL (ref 0–1)
BILIRUBIN URINE: NEGATIVE
BLOOD, URINE: NEGATIVE
BUN BLDV-MCNC: 11 MG/DL (ref 7–20)
CALCIUM SERPL-MCNC: 9.7 MG/DL (ref 8.3–10.6)
CHLORIDE BLD-SCNC: 102 MMOL/L (ref 99–110)
CHOLESTEROL, TOTAL: 189 MG/DL (ref 0–199)
CLARITY: CLEAR
CO2: 24 MMOL/L (ref 21–32)
COLOR: YELLOW
CREAT SERPL-MCNC: 0.6 MG/DL (ref 0.9–1.3)
EOSINOPHILS ABSOLUTE: 0.1 K/UL (ref 0–0.6)
EOSINOPHILS RELATIVE PERCENT: 2.7 %
GFR AFRICAN AMERICAN: >60
GFR NON-AFRICAN AMERICAN: >60
GLOBULIN: 3 G/DL
GLUCOSE BLD-MCNC: 99 MG/DL (ref 70–99)
GLUCOSE URINE: NEGATIVE MG/DL
HCT VFR BLD CALC: 42.9 % (ref 40.5–52.5)
HDLC SERPL-MCNC: 52 MG/DL (ref 40–60)
HEMOGLOBIN: 14.5 G/DL (ref 13.5–17.5)
KETONES, URINE: NEGATIVE MG/DL
LDL CHOLESTEROL CALCULATED: 116 MG/DL
LEUKOCYTE ESTERASE, URINE: NEGATIVE
LYMPHOCYTES ABSOLUTE: 1.6 K/UL (ref 1–5.1)
LYMPHOCYTES RELATIVE PERCENT: 31.8 %
MCH RBC QN AUTO: 33.3 PG (ref 26–34)
MCHC RBC AUTO-ENTMCNC: 33.9 G/DL (ref 31–36)
MCV RBC AUTO: 98.2 FL (ref 80–100)
MICROSCOPIC EXAMINATION: NORMAL
MONOCYTES ABSOLUTE: 0.3 K/UL (ref 0–1.3)
MONOCYTES RELATIVE PERCENT: 7 %
NEUTROPHILS ABSOLUTE: 2.8 K/UL (ref 1.7–7.7)
NEUTROPHILS RELATIVE PERCENT: 57.1 %
NITRITE, URINE: NEGATIVE
PDW BLD-RTO: 13.6 % (ref 12.4–15.4)
PH UA: 6 (ref 5–8)
PLATELET # BLD: 235 K/UL (ref 135–450)
PMV BLD AUTO: 8.7 FL (ref 5–10.5)
POTASSIUM SERPL-SCNC: 4.3 MMOL/L (ref 3.5–5.1)
PROTEIN UA: NEGATIVE MG/DL
RBC # BLD: 4.36 M/UL (ref 4.2–5.9)
SODIUM BLD-SCNC: 141 MMOL/L (ref 136–145)
SPECIFIC GRAVITY UA: 1.02 (ref 1–1.03)
TOTAL PROTEIN: 7.8 G/DL (ref 6.4–8.2)
TRIGL SERPL-MCNC: 107 MG/DL (ref 0–150)
TSH SERPL DL<=0.05 MIU/L-ACNC: 1.9 UIU/ML (ref 0.27–4.2)
URINE TYPE: NORMAL
UROBILINOGEN, URINE: 0.2 E.U./DL
VLDLC SERPL CALC-MCNC: 21 MG/DL
WBC # BLD: 4.9 K/UL (ref 4–11)

## 2019-07-31 LAB
HIV AG/AB: NORMAL
HIV ANTIGEN: NORMAL
HIV-1 ANTIBODY: NORMAL
HIV-2 AB: NORMAL

## 2019-08-05 ENCOUNTER — APPOINTMENT (OUTPATIENT)
Dept: PHYSICAL THERAPY | Age: 34
End: 2019-08-05
Payer: COMMERCIAL

## 2019-08-05 ENCOUNTER — HOSPITAL ENCOUNTER (OUTPATIENT)
Dept: PHYSICAL THERAPY | Age: 34
Setting detail: THERAPIES SERIES
Discharge: HOME | End: 2019-08-05
Payer: COMMERCIAL

## 2019-08-05 PROCEDURE — 97110 THERAPEUTIC EXERCISES: CPT

## 2019-08-12 ENCOUNTER — HOSPITAL ENCOUNTER (OUTPATIENT)
Dept: PHYSICAL THERAPY | Age: 34
Setting detail: THERAPIES SERIES
Discharge: HOME | End: 2019-08-12
Payer: COMMERCIAL

## 2019-08-12 ENCOUNTER — APPOINTMENT (OUTPATIENT)
Dept: PHYSICAL THERAPY | Age: 34
End: 2019-08-12
Payer: COMMERCIAL

## 2019-08-12 PROCEDURE — 97110 THERAPEUTIC EXERCISES: CPT

## 2019-08-12 NOTE — FLOWSHEET NOTE
-  [facility]    Physical Therapy Daily Treatment Note  Date:  2019    Patient Name:  Briana Correa    :  1985  MRN: [id number]  Medical/Treatment Diagnosis Information:  Diagnosis: M53.3 (ICD-10-CM) - Sacroiliac joint dysfunction, M47.817 (ICD-10-CM) - Lumbosacral spondylosis without myelopathy, M54.5 (ICD-10-CM) - Mechanical low back pain  Treatment Diagnosis: Pain in lower thoracic spine, decreased ROM, decreased tolerance to hold/initiate functional positions  Insurance/Certification information:  PT Insurance Information: [facility]   Physician Information:  Referring Practitioner: Wilbert Jon of care signed (Y/N):     Date of Patient follow up with Physician:     Progress Note: -  Yes  -  No  Next due by: Visit #10      Latex Allergy:  -NO      -YES  Preferred Language for Healthcare:   -English       -other:    Visit # Insurance Allowable Date Range   4 30 NA     Pain level:  2/10     SUBJECTIVE: Reports feeling he is getting \"better and better\". No soreness or pain with upgrades from last visit. Feels he is about 90% improved. \"the only limitation is when I'm standing and I bend over, that's it\". OBJECTIVE: See eval   Observation:    Test measurements:      RESTRICTIONS/PRECAUTIONS:     Exercises/Interventions:     Therapeutic Exercise (46239)  12' Resistance / level Sets / Seconds Reps Notes / Cues   Quadruped L rotations  2 10 Added  Cued for proper movement   Bird Dog UE only (Alternating)  2 10 Added . Cued for core activation   Prone Press up  3\" holds  2 10 Added   Cueing for proper technique          cable column rotation- bilat.   2# 1 20 Added    Lat pull downs 5# 3 10 Added  - cueing to limit shoulder elevation and maintain core contraction   Horizontal abduction Lime green 2 10 Added  - cueing to limit shoulder elevation and maintain core contraction   theraband diagonals Lime green 2 10 Added

## 2019-08-24 PROBLEM — Z00.00 PHYSICAL EXAM: Status: RESOLVED | Noted: 2019-07-25 | Resolved: 2019-08-24

## 2019-09-09 ENCOUNTER — HOSPITAL ENCOUNTER (OUTPATIENT)
Dept: PHYSICAL THERAPY | Age: 34
Setting detail: THERAPIES SERIES
Discharge: HOME | End: 2019-09-09
Payer: COMMERCIAL

## 2019-09-09 PROCEDURE — 97530 THERAPEUTIC ACTIVITIES: CPT

## 2019-09-09 PROCEDURE — 97110 THERAPEUTIC EXERCISES: CPT

## 2020-01-09 ENCOUNTER — OFFICE VISIT (OUTPATIENT)
Dept: PRIMARY CARE CLINIC | Age: 35
End: 2020-01-09
Payer: COMMERCIAL

## 2020-01-09 VITALS
WEIGHT: 131 LBS | DIASTOLIC BLOOD PRESSURE: 72 MMHG | HEIGHT: 64 IN | TEMPERATURE: 97.8 F | BODY MASS INDEX: 22.36 KG/M2 | OXYGEN SATURATION: 99 % | HEART RATE: 69 BPM | SYSTOLIC BLOOD PRESSURE: 111 MMHG

## 2020-01-09 PROBLEM — R21 RASH: Status: ACTIVE | Noted: 2020-01-09

## 2020-01-09 PROCEDURE — G8420 CALC BMI NORM PARAMETERS: HCPCS | Performed by: INTERNAL MEDICINE

## 2020-01-09 PROCEDURE — G8427 DOCREV CUR MEDS BY ELIG CLIN: HCPCS | Performed by: INTERNAL MEDICINE

## 2020-01-09 PROCEDURE — G8484 FLU IMMUNIZE NO ADMIN: HCPCS | Performed by: INTERNAL MEDICINE

## 2020-01-09 PROCEDURE — 1036F TOBACCO NON-USER: CPT | Performed by: INTERNAL MEDICINE

## 2020-01-09 PROCEDURE — 99213 OFFICE O/P EST LOW 20 MIN: CPT | Performed by: INTERNAL MEDICINE

## 2020-01-09 RX ORDER — PERMETHRIN 50 MG/G
CREAM TOPICAL
Qty: 1 TUBE | Refills: 0 | Status: SHIPPED | OUTPATIENT
Start: 2020-01-09

## 2020-01-09 RX ORDER — PERMETHRIN 50 MG/G
CREAM TOPICAL
Qty: 1 TUBE | Refills: 0 | Status: SHIPPED | OUTPATIENT
Start: 2020-01-09 | End: 2020-01-09 | Stop reason: SDUPTHER

## 2020-01-09 RX ORDER — PREDNISONE 10 MG/1
TABLET ORAL
Qty: 53 TABLET | Refills: 0 | Status: SHIPPED | OUTPATIENT
Start: 2020-01-09 | End: 2020-01-09 | Stop reason: SDUPTHER

## 2020-01-09 RX ORDER — PREDNISONE 10 MG/1
TABLET ORAL
Qty: 53 TABLET | Refills: 0 | Status: SHIPPED | OUTPATIENT
Start: 2020-01-09 | End: 2020-01-19

## 2020-01-09 ASSESSMENT — ENCOUNTER SYMPTOMS
EYE PAIN: 0
EYE DISCHARGE: 0
PHOTOPHOBIA: 0
EYE ITCHING: 0
RESPIRATORY NEGATIVE: 1
GASTROINTESTINAL NEGATIVE: 1
EYE REDNESS: 0

## 2020-01-09 ASSESSMENT — PATIENT HEALTH QUESTIONNAIRE - PHQ9
2. FEELING DOWN, DEPRESSED OR HOPELESS: 0
1. LITTLE INTEREST OR PLEASURE IN DOING THINGS: 0
SUM OF ALL RESPONSES TO PHQ QUESTIONS 1-9: 0
SUM OF ALL RESPONSES TO PHQ9 QUESTIONS 1 & 2: 0
SUM OF ALL RESPONSES TO PHQ QUESTIONS 1-9: 0

## 2020-01-09 NOTE — PROGRESS NOTES
06/24/2029    Flu vaccine  Completed    HIV screen  Completed    Pneumococcal 0-64 years Vaccine  Aged Out          Assessment/Plan:    Rash  Use the permethrin topically as directed. Take the prednisone as directed by mouth. Follow-up with me in 2 months. 1. Rash    - Syphilis Antibody Cascading Reflex; Future  - [address] [facility] Berry [facility]  - Syphilis Antibody Cascading Reflex       No follow-ups on file. p

## 2020-01-11 LAB — TOTAL SYPHILLIS IGG/IGM: NORMAL

## 2020-01-27 ENCOUNTER — OFFICE VISIT (OUTPATIENT)
Dept: DERMATOLOGY | Age: 35
End: 2020-01-27
Payer: COMMERCIAL

## 2020-01-27 PROCEDURE — 99243 OFF/OP CNSLTJ NEW/EST LOW 30: CPT | Performed by: DERMATOLOGY

## 2020-01-27 RX ORDER — DESONIDE 0.5 MG/G
OINTMENT TOPICAL
Qty: 30 G | Refills: 0 | Status: SHIPPED | OUTPATIENT
Start: 2020-01-27 | End: 2020-02-26

## 2020-01-27 NOTE — PATIENT INSTRUCTIONS
Dry Skin Care    Bath Temperature:  Use lukewarm water; hot water dries the skin, and although it may feel good for a few minutes, it will itch even more later on. Length of Bath:  A short shower is much better than a long one. Long showers or baths wash off the natural protective oils of the skin. A lukewarm tub-bath is OK, particularly if you like to use bath oil in the water. Always remember that a quick, cool shower using no soap is preferable to a long, hot soapy one. Cleansing:  Soaps and detergents remove the natural protective oils, leaving the skin dry and irritated. If cleansing is really necessary, soaps such as Basis, Cetaphil, Dove, or Purpose are acceptable. Body washes with petrolatum, or oils such as soybean oil, sunflower seed oil are also okay (Dove, Eucerin, etc. have these). However, clear water will remove dust and sweat and grime and soaps and washes are needed only in the odor bearing areas (underarms, groin, feet). It is important to rinse these areas very well to remove all traces of soap. Avoid excessive scrubbing of the outer arms, legs, and back, and use your hands to bathe--not a washcloth, loofah, or sponge. Moisturizing:  Bath or mineral oils can be added to the water or applied directly to the wet skin after the shower or tub bath. Avoid oils with strong perfumes that irritate your skin. Thicker creams like Eucerin and Cetaphil, or ointments like Aquaphor and white petroleum jelly (Vaseline) are better. They may be greasier, but are more effective. Put them on immediately after bathing and patting dry. Additional Suggestions and Summary:     Do not take bubble baths      Do not allow the blower from a furnace or car heater to blow on your skin     Avoid using alcohol, astringents, calamine lotion, hydrogen peroxide, powders or deodorant sprays as these will dry out your skin      Avoid hot tubs.  Hot chlorinated water will strip the natural oils from your skin     Use soap less often, and try a mild fragrance-free soap.  Use moisturizer during the day as often as possible.  Within 3 minutes after bathing, apply the moisturizer to your entire body to trap the moisture in your skin.  Avoid long, hot showers because hot water removes oils from your skin more quickly.  Ointments (clear, thick, greasy) and creams (white, thick, in a jar) work better as moisturizers than lotions (white, thin, easier to spread).  Ointment examples: Aquaphor ointment, Vaseline (petrolatum, petroleum jelly).      Cream examples (creams are in jars): Cetaphil, CeraVe, Vanicream, Eucerin, Lubriderm, Aveeno, Oil of Olay, Mild soap examples: Dove, Oil of Olay, Cetaphil, Purpose, CeraVe

## 2020-01-27 NOTE — PROGRESS NOTES
a family history of melanoma. Allergies:  No Known Allergies    Current Medications:  Current Outpatient Medications   Medication Sig Dispense Refill    permethrin (ELIMITE) 5 % cream Apply topically as directed (Patient not taking: Reported on 1/27/2020) 1 Tube 0     No current facility-administered medications for this visit. Review of Systems:  Constitutional: No fevers, chills or recent illness. feels well   Skin: Skin:As per HPI AND otherwise no new, bleeding or symptomatic skin lesions      Objective:     Physical Examination:  General: alert, comfortable, no apparent distress, well-appearing  Psych: alert, oriented and pleasant  Neuro: oriented to person, place, and time  Skin: Areas examined: head including face, conjunctiva and lids, hair/scalp, neck, chest, including breasts and axilla, abdomen, back, buttocks, right upper extremity, left upper extremity, right lower extremity, left lower extremity, digits and nails and groin, genitalia      All areas examined were within normal limits except those listed below with the appropriate assessment and plan    Assessment and Plan (with relevant objective exam findings):   1. Lichen simplex chronicus  Location: genitalia (scrotum)    Objective: Scrotum with increased rugae with erythematous plaque that is lichenified. The diagnosis and treatment options were discussed with the patient.       -Will initiate treatment with Desonide 0.05% ointment twice daily M-F, then repeat for one week, then taper to as needed. 2. Atopic dermatitis  - Location: Back and chest  Objective findings: Hyperpigmented healed papules with central excoriation    Natural history of condition was reviewed as well as treatment options. Discussed the importance of a fragrance-free and sensitive skin care regimen  including use of mild cleansers, avoiding hot water and moisturizing regularly with cream emollients.   Patient was advised of the potential for cutaneous atrophy with prolonged continuous use of topical steroid beyond 2-4 weeks. Can be treated with topical steroids, calcineurin inhibitors, emollients, oral antihistamines, bleach bath therapy, and in recalcitrant cases may need oral immunosuppressents, dupilumab (an IL-4 receptor antagonist), and other therapies. After discusion of treatment options the following were started:  - Steroids: (all used at MOST twice daily Monday-Friday)    Desonide 0.05% ointment to red, itchy areas  - Moisturizing discussed. These should be used several times a day, and on the weekends. Some possible suggestions are Eucerin, Cetaphil, Vaseline, Aquaphor, or Cerave. Bleach Baths 3 times weekly. Put 1/2 cup bleach in the full tub of water, soak for 10 minutes, then rinse with lukewarm water, and pat dry, then apply moisturizer or steroids    . 3. Solar Lentigo  Location: sun exposed areas, most prominently on the face      Objective: A few lacy brown macules ranging in size from 2-10 mm diameter. The lentigines seen today are benign in character, caused by the sun, and do not require treatment. However, they do occasionally transform into a malignancy, so the patient needs to monitor for changes. They were educated on what a suspicious change would include, change in size or color, and included education on the ABCDs of melanoma. These lesions can also be removed for cosmetic purposes with chemical peels, laser or cryosurgery for a cosmetic fee if the patient desires. 4. Striae Distensae  Location: Inner thighs  Objective: Linear scar cord like plaques that are violaceous/brown in color     Discussed that genetics and rapid growth both likely play a role in the development of stretch marks (striae). However in his case, it may have been related to the use of long term potent topical steroids. May treat with cocoa butter creams bid to all affected areas, lasers can also be used to help reduce the appearance.     Treatment :  None today      Follow up:  Return visit in 4 weeks or as needed for change in condition. All questions addressed. Procedure:   No procedure performed    I saw your patient Grecia Briscoe at the date listed above in my Dermatology  clinic in [facility]. Thank you very much for the referral.    My exam findings, assessment and plan can be found in EPIC, I have also attached them below for your convenience. I very much appreciate your referral and the opportunity to participate in this patient's care. Please don't hesitate to contact me with questions or concerns about our visit or management of this patient in the future. Please feel free to call me anytime on my cell, at [phone]  and/or e-mail at [email or web address]  if I can be of any further assistance to you or to any of your patients.         Nehemiah Cerrato MD. MS

## 2020-03-09 ENCOUNTER — OFFICE VISIT (OUTPATIENT)
Dept: DERMATOLOGY | Age: 35
End: 2020-03-09
Payer: COMMERCIAL

## 2020-03-09 PROCEDURE — 99214 OFFICE O/P EST MOD 30 MIN: CPT | Performed by: DERMATOLOGY

## 2020-03-09 RX ORDER — KETOCONAZOLE 20 MG/ML
SHAMPOO TOPICAL
Qty: 120 ML | Refills: 11 | Status: SHIPPED | OUTPATIENT
Start: 2020-03-09

## 2020-03-09 RX ORDER — DESONIDE 0.5 MG/G
OINTMENT TOPICAL
Qty: 30 G | Refills: 2 | Status: SHIPPED | OUTPATIENT
Start: 2020-03-09 | End: 2020-04-08

## 2020-03-09 NOTE — PROGRESS NOTES
slghtly improved    Location: scrotum    Objective: Erythematous well demarcated patch that is lichenified with pinpoint hemorrhagic excoriations in certain areas with exaggerated ruggae    The diagnosis and treatment options were discussed with the patient. - Will initiate treatment with Triamcinolone 0.1% ointment BID M, W, Tue and Desonide 0.05% ointment twice daily Wed-Sun. Repeat for two weeks, then apply Desonide twice daily for 1 week. - patient was instructed to avoid using soaps in this area or any scented products. He verbalized understanding    2. Atopic dermatitis improved  - Dry and sensitive skin care was discussed again. Moisturizing discussed. These should be used several times a day, and on the weekends. Some possible suggestions are Eucerin, Cetaphil, Vaseline, Aquaphor, or Cerave. 3. Seborrheic dermatitis. Location: scalp  Objective: patches of scaling and mild erythema     I explained that this condition is likely caused in part by a yeast on the skin. It tends to be recurrent. Long term treatment is likely needed to control the disease. After discussion of treatment options, risks, and alternatives, patient elected to treat with the following:     Ketoconazole 2% shampoo three times weekly (M, W, F) leave in 3-5 min, then rinse. May alternate with other shampoos      4. Post-inflammatory hyperpigmentation  Location: trunk  Findings: hyperpigmented patches    Discussed that this is due to the underlying inflammation in the skin and if we can control the cause well, it should fade gradually with time        Follow up:  Return visit in 4 weeks or as needed for change in condition. All questions addressed.      Procedure:     No procedure performed      Kelsea Feliz MD. MS

## 2020-12-17 ENCOUNTER — HOSPITAL ENCOUNTER (OUTPATIENT)
Age: 35
Discharge: HOME | End: 2020-12-17
Payer: COMMERCIAL

## 2020-12-17 ENCOUNTER — HOSPITAL ENCOUNTER (OUTPATIENT)
Dept: GENERAL RADIOLOGY | Age: 35
Discharge: HOME | End: 2020-12-17
Payer: COMMERCIAL

## 2020-12-17 PROCEDURE — 71046 X-RAY EXAM CHEST 2 VIEWS: CPT

## 2021-10-20 ENCOUNTER — HOSPITAL ENCOUNTER (OUTPATIENT)
Dept: GENERAL RADIOLOGY | Age: 36
Discharge: HOME | End: 2021-10-20
Payer: COMMERCIAL

## 2021-10-20 ENCOUNTER — HOSPITAL ENCOUNTER (OUTPATIENT)
Age: 36
Discharge: HOME | End: 2021-10-20
Payer: COMMERCIAL

## 2021-10-20 DIAGNOSIS — R52 PAIN: ICD-10-CM

## 2021-10-20 PROCEDURE — 70110 X-RAY EXAM OF JAW 4/> VIEWS: CPT

## 2022-08-12 ENCOUNTER — HOSPITAL ENCOUNTER (OUTPATIENT)
Age: 37
Discharge: HOME | End: 2022-08-12
Payer: COMMERCIAL

## 2022-08-12 LAB
ANION GAP SERPL CALCULATED.3IONS-SCNC: 12 MMOL/L (ref 3–16)
BUN BLDV-MCNC: 13 MG/DL (ref 7–20)
CALCIUM SERPL-MCNC: 8.8 MG/DL (ref 8.3–10.6)
CHLORIDE BLD-SCNC: 104 MMOL/L (ref 99–110)
CO2: 25 MMOL/L (ref 21–32)
CREAT SERPL-MCNC: 0.7 MG/DL (ref 0.9–1.3)
GFR AFRICAN AMERICAN: >60
GFR NON-AFRICAN AMERICAN: >60
GLUCOSE BLD-MCNC: 97 MG/DL (ref 70–99)
MAGNESIUM: 2.2 MG/DL (ref 1.8–2.4)
POTASSIUM SERPL-SCNC: 4 MMOL/L (ref 3.5–5.1)
SODIUM BLD-SCNC: 141 MMOL/L (ref 136–145)
VITAMIN D 25-HYDROXY: 19.6 NG/ML

## 2022-08-12 PROCEDURE — 83735 ASSAY OF MAGNESIUM: CPT

## 2022-08-12 PROCEDURE — 36415 COLL VENOUS BLD VENIPUNCTURE: CPT

## 2022-08-12 PROCEDURE — 82306 VITAMIN D 25 HYDROXY: CPT

## 2022-08-12 PROCEDURE — 80048 BASIC METABOLIC PNL TOTAL CA: CPT

## 2023-01-20 ENCOUNTER — HOSPITAL ENCOUNTER (OUTPATIENT)
Dept: GENERAL RADIOLOGY | Age: 38
Discharge: HOME | End: 2023-01-20
Payer: COMMERCIAL

## 2023-01-20 ENCOUNTER — HOSPITAL ENCOUNTER (OUTPATIENT)
Age: 38
Discharge: HOME | End: 2023-01-20
Payer: COMMERCIAL

## 2023-01-20 DIAGNOSIS — R52 PAIN: ICD-10-CM

## 2023-01-20 PROCEDURE — 73562 X-RAY EXAM OF KNEE 3: CPT

## 2023-01-27 ENCOUNTER — HOSPITAL ENCOUNTER (OUTPATIENT)
Dept: ULTRASOUND IMAGING | Age: 38
Discharge: HOME | End: 2023-01-27
Payer: COMMERCIAL

## 2023-01-27 DIAGNOSIS — R19.01 RIGHT UPPER QUADRANT ABDOMINAL MASS: ICD-10-CM

## 2023-01-27 PROCEDURE — 76700 US EXAM ABDOM COMPLETE: CPT

## 2023-06-02 ENCOUNTER — HOSPITAL ENCOUNTER (OUTPATIENT)
Dept: CT IMAGING | Age: 38
Discharge: HOME | End: 2023-06-02
Payer: COMMERCIAL

## 2023-06-02 DIAGNOSIS — R10.11 RUQ ABDOMINAL PAIN: ICD-10-CM

## 2023-06-02 PROCEDURE — 6360000004 HC RX CONTRAST MEDICATION: Performed by: FAMILY MEDICINE

## 2023-06-02 PROCEDURE — 74177 CT ABD & PELVIS W/CONTRAST: CPT

## 2023-06-02 RX ADMIN — IOPAMIDOL 75 ML: 755 INJECTION, SOLUTION INTRAVENOUS at 10:43

## 2023-06-02 RX ADMIN — IOHEXOL 50 ML: 240 INJECTION, SOLUTION INTRATHECAL; INTRAVASCULAR; INTRAVENOUS; ORAL at 10:40

## 2025-09-04 ENCOUNTER — TRANSCRIBE ORDERS (OUTPATIENT)
Dept: ADMINISTRATIVE | Age: 40
End: 2025-09-04

## 2025-09-04 DIAGNOSIS — R13.10 ODYNOPHAGIA: Primary | ICD-10-CM

## 2025-09-05 ENCOUNTER — HOSPITAL ENCOUNTER (OUTPATIENT)
Dept: GENERAL RADIOLOGY | Age: 40
Discharge: HOME | End: 2025-09-05
Payer: COMMERCIAL

## 2025-09-05 DIAGNOSIS — R13.10 ODYNOPHAGIA: ICD-10-CM

## 2025-09-05 PROCEDURE — 74220 X-RAY XM ESOPHAGUS 1CNTRST: CPT
